# Patient Record
Sex: FEMALE | Race: WHITE | NOT HISPANIC OR LATINO | Employment: FULL TIME | URBAN - METROPOLITAN AREA
[De-identification: names, ages, dates, MRNs, and addresses within clinical notes are randomized per-mention and may not be internally consistent; named-entity substitution may affect disease eponyms.]

---

## 2017-06-29 ENCOUNTER — APPOINTMENT (OUTPATIENT)
Dept: WOMENS IMAGING | Facility: HOSPITAL | Age: 52
End: 2017-06-29

## 2017-06-29 PROCEDURE — 77067 SCR MAMMO BI INCL CAD: CPT | Performed by: RADIOLOGY

## 2017-08-08 ENCOUNTER — OFFICE VISIT (OUTPATIENT)
Dept: CARDIOLOGY | Facility: CLINIC | Age: 52
End: 2017-08-08

## 2017-08-08 VITALS
WEIGHT: 189.5 LBS | DIASTOLIC BLOOD PRESSURE: 94 MMHG | HEART RATE: 58 BPM | HEIGHT: 64 IN | SYSTOLIC BLOOD PRESSURE: 128 MMHG | BODY MASS INDEX: 32.35 KG/M2

## 2017-08-08 DIAGNOSIS — E78.5 HYPERLIPIDEMIA, UNSPECIFIED HYPERLIPIDEMIA TYPE: Primary | ICD-10-CM

## 2017-08-08 PROCEDURE — 93000 ELECTROCARDIOGRAM COMPLETE: CPT | Performed by: NURSE PRACTITIONER

## 2017-08-08 PROCEDURE — 99213 OFFICE O/P EST LOW 20 MIN: CPT | Performed by: NURSE PRACTITIONER

## 2017-08-08 RX ORDER — EVENING PRIMROSE OIL 500 MG
CAPSULE ORAL DAILY
COMMUNITY
End: 2018-04-26

## 2017-08-08 NOTE — PROGRESS NOTES
Date of Office Visit: 2017  Encounter Provider: MINGO Acosta  Place of Service: [facility] CARDIOLOGY  Patient Name: SHANNAN Teague  :1965    Chief Complaint   Patient presents with   • Coronary Artery Disease     yearly follow up   :     HPI: SHANNAN Teague is a 51 y.o. female comes in today for yearly follow up. SHe has been followed for her cardiac risks.     She is a patient of Dr. Fernandez.  I am seeing her for the first time today and have reviewed her records.  In , she had a stress echocardiogram performed for chest pain.  This was negative and her ejection fraction was 55-60%.  All of her valves were normal.      She has a significant family history of coronary disease.  Her mother had a triple bypass at the age of 63.  Her brother had quadruple bypass at 57 and another brother had a myocardial infarction at 57.  Her father had a myocardial infarction at 65.  Her mother also had TIAs and hypertension runs in her family.      In 2014 she had vascular screening that was normal.  She had a calcium score performed in 2014 that gave her a score of 95, and all that calcium really was in the LAD artery.      Today, she comes in for follow-up.  She has been feeling well over the past year.  She did have some problems with gastrointestinal issues but this has now resolved.  She denies any palpitations or tachycardia, shortness of breath, edema, dizziness, chest pain, fatigue, orthopnea, or paroxysmal nocturnal dyspnea.  She denies syncope or presyncope.  She is going to schedule a follow-up with her primary care physician to schedule her lipid panel and other yearly testing.          Past Medical History:   Diagnosis Date   • Acute epigastric pain    • Bloating    • Conjunctivitis    • Diarrhea    • Dysphagia    • H/O colonoscopy 2013   • H/O echocardiogram 2012   • Health care maintenance    • History of EKG 2015   • Hyperlipidemia    •  "Hypothyroidism    • Insomnia    • Nausea & vomiting        Past Surgical History:   Procedure Laterality Date   • BACK SURGERY     • COLONOSCOPY  11/22/2013           Review of Systems   Constitution: Negative for fever and malaise/fatigue.   HENT: Negative for ear pain, hearing loss, nosebleeds and sore throat.    Eyes: Negative for double vision, pain, vision loss in left eye, vision loss in right eye and visual disturbance.   Cardiovascular: Negative for claudication and leg swelling.   Respiratory: Negative for cough, snoring and wheezing.    Endocrine: Negative for cold intolerance, heat intolerance and polyuria.   Skin: Negative for color change, itching and rash.   Musculoskeletal: Negative for joint pain, joint swelling and muscle cramps.   Gastrointestinal: Negative for abdominal pain, diarrhea, melena, nausea and vomiting.   Genitourinary: Negative for bladder incontinence and hematuria.   Neurological: Negative for excessive daytime sleepiness, dizziness, light-headedness, paresthesias and seizures.   Psychiatric/Behavioral: Negative for depression. The patient is not nervous/anxious.    All other systems reviewed and are negative.    All other systems reviewed and are negative    Allergies   Allergen Reactions   • Azithromycin    • Penicillins        All aspects of family and social history reviewed.          Objective:     Vitals:    08/08/17 0948   BP: 128/94   BP Location: Right arm   Pulse: 58   Weight: 189 lb 8 oz (86 kg)   Height: 64\" (162.6 cm)     Body mass index is 32.53 kg/(m^2).    PHYSICAL EXAM:  Physical Exam   Constitutional: She is oriented to person, place, and time. She appears well-developed and well-nourished.   overweight   HENT:   Head: Normocephalic and atraumatic.   Neck: Neck supple. No JVD present.   Cardiovascular: Normal rate, regular rhythm, normal heart sounds and intact distal pulses.    Pulses:       Carotid pulses are 2+ on the right side, and 2+ on the left side.       " Radial pulses are 2+ on the right side, and 2+ on the left side.        Dorsalis pedis pulses are 2+ on the right side, and 2+ on the left side.   Pulmonary/Chest: Effort normal and breath sounds normal. No accessory muscle usage. No respiratory distress. She has no rales.   Abdominal: Soft. Normal appearance and bowel sounds are normal. There is no tenderness.   Musculoskeletal: Normal range of motion. She exhibits no edema.   Neurological: She is alert and oriented to person, place, and time.   Skin: Skin is warm, dry and intact. She is not diaphoretic.   Psychiatric: She has a normal mood and affect. Her speech is normal and behavior is normal. Judgment and thought content normal. Cognition and memory are normal.         ECG 12 Lead  Date/Time: 8/8/2017 10:12 AM  Performed by: TERENCE SIFUENTES  Authorized by: TERENCE SIFUENTES   Comparison: compared with previous ECG from 8/4/2016  Similar to previous ECG  Rhythm: sinus rhythm  Rate: normal  BPM: 58  Conduction: conduction normal  ST Segments: ST segments normal  T Waves: T waves normal  QRS axis: normal  Other: no other findings  Clinical impression: normal ECG  Comments: Indication: family history, elevated calcium score                Assessment:       Diagnosis Plan   1. Hyperlipidemia, unspecified hyperlipidemia type          Orders Placed This Encounter   Procedures   • ECG 12 Lead     This order was created via procedure documentation       Current Outpatient Prescriptions   Medication Sig Dispense Refill   • Evening Primrose Oil 500 MG capsule Take  by mouth Daily.     • levothyroxine (SYNTHROID, LEVOTHROID) 88 MCG tablet Take 1 tablet by mouth daily. 30 tablet 11   • traZODone (DESYREL) 50 MG tablet Take 1 tablet by mouth every night. 90 tablet 3     No current facility-administered medications for this visit.             Plan:       1. Hyperlipidemia.  The last lipid panel was over a year ago.  At last office visit, she was going to try atorvastatin 10  mg twice a week.  This has not been started.  She is going to follow up with Dr. Galdamez to have her lipid panel drawn.      Strong family history of cardiovascular disease.  Calcium score of less than 100.  Continue to monitor.  No symptoms.      Follow up in the office in one year with Dr. Fernandez.          As always, it has been a pleasure to participate in this patient's care.      Sincerely,      MINGO Acosta

## 2017-08-11 DIAGNOSIS — Z00.00 PREVENTATIVE HEALTH CARE: Primary | ICD-10-CM

## 2017-08-11 DIAGNOSIS — Z11.59 NEED FOR HEPATITIS C SCREENING TEST: ICD-10-CM

## 2017-08-16 ENCOUNTER — RESULTS ENCOUNTER (OUTPATIENT)
Dept: SPORTS MEDICINE | Facility: CLINIC | Age: 52
End: 2017-08-16

## 2017-08-16 DIAGNOSIS — Z11.59 NEED FOR HEPATITIS C SCREENING TEST: ICD-10-CM

## 2017-08-16 DIAGNOSIS — Z00.00 PREVENTATIVE HEALTH CARE: ICD-10-CM

## 2017-08-18 DIAGNOSIS — E03.9 ACQUIRED HYPOTHYROIDISM: ICD-10-CM

## 2017-08-18 LAB
ALBUMIN SERPL-MCNC: 4.5 G/DL (ref 3.5–5.2)
ALBUMIN/GLOB SERPL: 1.7 G/DL
ALP SERPL-CCNC: 42 U/L (ref 39–117)
ALT SERPL-CCNC: 23 U/L (ref 1–33)
APPEARANCE UR: CLEAR
AST SERPL-CCNC: 19 U/L (ref 1–32)
BACTERIA #/AREA URNS HPF: ABNORMAL /HPF
BACTERIA UR CULT: ABNORMAL
BASOPHILS # BLD AUTO: 0.02 10*3/MM3 (ref 0–0.2)
BASOPHILS NFR BLD AUTO: 0.4 % (ref 0–1.5)
BILIRUB SERPL-MCNC: 0.9 MG/DL (ref 0.1–1.2)
BILIRUB UR QL STRIP: NEGATIVE
BUN SERPL-MCNC: 29 MG/DL (ref 6–20)
BUN/CREAT SERPL: 27.6 (ref 7–25)
CALCIUM SERPL-MCNC: 10 MG/DL (ref 8.6–10.5)
CHLORIDE SERPL-SCNC: 104 MMOL/L (ref 98–107)
CHOLEST SERPL-MCNC: 232 MG/DL (ref 0–200)
CHOLEST/HDLC SERPL: 4.64 {RATIO}
CO2 SERPL-SCNC: 25.5 MMOL/L (ref 22–29)
COLOR UR: YELLOW
CREAT SERPL-MCNC: 1.05 MG/DL (ref 0.57–1)
EOSINOPHIL # BLD AUTO: 0.1 10*3/MM3 (ref 0–0.7)
EOSINOPHIL NFR BLD AUTO: 2.2 % (ref 0.3–6.2)
EPI CELLS #/AREA URNS HPF: ABNORMAL /HPF
ERYTHROCYTE [DISTWIDTH] IN BLOOD BY AUTOMATED COUNT: 13.4 % (ref 11.7–13)
GLOBULIN SER CALC-MCNC: 2.6 GM/DL
GLUCOSE SERPL-MCNC: 97 MG/DL (ref 65–99)
GLUCOSE UR QL: NEGATIVE
HCT VFR BLD AUTO: 42.4 % (ref 35.6–45.5)
HCV AB S/CO SERPL IA: <0.1 S/CO RATIO (ref 0–0.9)
HDLC SERPL-MCNC: 50 MG/DL (ref 40–60)
HGB BLD-MCNC: 13.1 G/DL (ref 11.9–15.5)
HGB UR QL STRIP: NEGATIVE
IMM GRANULOCYTES # BLD: 0 10*3/MM3 (ref 0–0.03)
IMM GRANULOCYTES NFR BLD: 0 % (ref 0–0.5)
KETONES UR QL STRIP: NEGATIVE
LDLC SERPL CALC-MCNC: 158 MG/DL (ref 0–100)
LEUKOCYTE ESTERASE UR QL STRIP: ABNORMAL
LYMPHOCYTES # BLD AUTO: 2.44 10*3/MM3 (ref 0.9–4.8)
LYMPHOCYTES NFR BLD AUTO: 53.3 % (ref 19.6–45.3)
MCH RBC QN AUTO: 30.2 PG (ref 26.9–32)
MCHC RBC AUTO-ENTMCNC: 30.9 G/DL (ref 32.4–36.3)
MCV RBC AUTO: 97.7 FL (ref 80.5–98.2)
MICRO URNS: ABNORMAL
MONOCYTES # BLD AUTO: 0.38 10*3/MM3 (ref 0.2–1.2)
MONOCYTES NFR BLD AUTO: 8.3 % (ref 5–12)
NEUTROPHILS # BLD AUTO: 1.64 10*3/MM3 (ref 1.9–8.1)
NEUTROPHILS NFR BLD AUTO: 35.8 % (ref 42.7–76)
NITRITE UR QL STRIP: NEGATIVE
OTHER ANTIBIOTIC SUSC ISLT: ABNORMAL
PH UR STRIP: 5.5 [PH] (ref 5–7.5)
PLATELET # BLD AUTO: 216 10*3/MM3 (ref 140–500)
POTASSIUM SERPL-SCNC: 5 MMOL/L (ref 3.5–5.2)
PROT SERPL-MCNC: 7.1 G/DL (ref 6–8.5)
PROT UR QL STRIP: NEGATIVE
RBC # BLD AUTO: 4.34 10*6/MM3 (ref 3.9–5.2)
RBC #/AREA URNS HPF: ABNORMAL /HPF
SODIUM SERPL-SCNC: 141 MMOL/L (ref 136–145)
SP GR UR: 1.02 (ref 1–1.03)
T4 FREE SERPL-MCNC: 1.46 NG/DL (ref 0.93–1.7)
TRIGL SERPL-MCNC: 120 MG/DL (ref 0–150)
TSH SERPL DL<=0.005 MIU/L-ACNC: 5.39 MIU/ML (ref 0.27–4.2)
URINALYSIS REFLEX: ABNORMAL
UROBILINOGEN UR STRIP-MCNC: 0.2 MG/DL (ref 0.2–1)
VLDLC SERPL CALC-MCNC: 24 MG/DL (ref 5–40)
WBC # BLD AUTO: 4.58 10*3/MM3 (ref 4.5–10.7)
WBC #/AREA URNS HPF: ABNORMAL /HPF

## 2017-08-18 RX ORDER — LEVOTHYROXINE SODIUM 88 UG/1
TABLET ORAL
Qty: 30 TABLET | Refills: 0 | OUTPATIENT
Start: 2017-08-18

## 2017-08-18 RX ORDER — LEVOTHYROXINE SODIUM 0.1 MG/1
100 TABLET ORAL DAILY
Qty: 30 TABLET | Refills: 11 | Status: SHIPPED | OUTPATIENT
Start: 2017-08-18 | End: 2017-08-18 | Stop reason: SDUPTHER

## 2017-08-21 RX ORDER — LEVOTHYROXINE SODIUM 0.1 MG/1
TABLET ORAL
Qty: 90 TABLET | Refills: 11 | Status: SHIPPED | OUTPATIENT
Start: 2017-08-21 | End: 2018-04-26 | Stop reason: SDUPTHER

## 2017-08-21 RX ORDER — LEVOTHYROXINE SODIUM 88 UG/1
TABLET ORAL
Qty: 30 TABLET | Refills: 0 | OUTPATIENT
Start: 2017-08-21

## 2017-09-11 ENCOUNTER — TELEPHONE (OUTPATIENT)
Dept: SPORTS MEDICINE | Facility: CLINIC | Age: 52
End: 2017-09-11

## 2017-09-11 DIAGNOSIS — E03.9 ACQUIRED HYPOTHYROIDISM: Primary | ICD-10-CM

## 2017-09-11 NOTE — TELEPHONE ENCOUNTER
Patient thinks that new dose of synthroid is making her real jittery, she would like to know what to do

## 2017-09-12 ENCOUNTER — LAB (OUTPATIENT)
Dept: SPORTS MEDICINE | Facility: CLINIC | Age: 52
End: 2017-09-12

## 2017-09-12 DIAGNOSIS — E03.9 ACQUIRED HYPOTHYROIDISM: ICD-10-CM

## 2017-09-12 LAB
T4 FREE SERPL-MCNC: 1.59 NG/DL (ref 0.93–1.7)
TSH SERPL DL<=0.005 MIU/L-ACNC: 0.95 MIU/ML (ref 0.27–4.2)

## 2017-09-13 ENCOUNTER — TELEPHONE (OUTPATIENT)
Dept: SPORTS MEDICINE | Facility: CLINIC | Age: 52
End: 2017-09-13

## 2017-09-13 RX ORDER — DEXTROMETHORPHAN HYDROBROMIDE AND PROMETHAZINE HYDROCHLORIDE 15; 6.25 MG/5ML; MG/5ML
1.25 SYRUP ORAL 4 TIMES DAILY PRN
Qty: 180 ML | Refills: 0 | Status: SHIPPED | OUTPATIENT
Start: 2017-09-13 | End: 2018-02-13

## 2017-09-13 RX ORDER — DOXYCYCLINE 100 MG/1
100 TABLET ORAL 2 TIMES DAILY
Qty: 20 TABLET | Refills: 0 | Status: SHIPPED | OUTPATIENT
Start: 2017-09-13 | End: 2017-09-23

## 2017-09-13 NOTE — TELEPHONE ENCOUNTER
The thyroid level looks better but it could eventually be too high of a dose.  They do not make a dose in between her old and new dose.  We can go back to the old dose and recheck levels in 6 weeks

## 2017-09-18 DIAGNOSIS — F51.01 PRIMARY INSOMNIA: ICD-10-CM

## 2017-09-19 DIAGNOSIS — F51.01 PRIMARY INSOMNIA: ICD-10-CM

## 2017-09-20 RX ORDER — TRAZODONE HYDROCHLORIDE 50 MG/1
TABLET ORAL
Qty: 90 TABLET | Refills: 0 | OUTPATIENT
Start: 2017-09-20

## 2018-02-13 ENCOUNTER — OFFICE VISIT (OUTPATIENT)
Dept: SPORTS MEDICINE | Facility: CLINIC | Age: 53
End: 2018-02-13

## 2018-02-13 VITALS
BODY MASS INDEX: 33.97 KG/M2 | TEMPERATURE: 97.8 F | OXYGEN SATURATION: 98 % | WEIGHT: 199 LBS | SYSTOLIC BLOOD PRESSURE: 112 MMHG | HEIGHT: 64 IN | HEART RATE: 72 BPM | DIASTOLIC BLOOD PRESSURE: 84 MMHG

## 2018-02-13 DIAGNOSIS — H69.81 ETD (EUSTACHIAN TUBE DYSFUNCTION), RIGHT: Primary | ICD-10-CM

## 2018-02-13 DIAGNOSIS — E03.9 ACQUIRED HYPOTHYROIDISM: ICD-10-CM

## 2018-02-13 PROCEDURE — 99213 OFFICE O/P EST LOW 20 MIN: CPT | Performed by: FAMILY MEDICINE

## 2018-02-13 RX ORDER — METHYLPREDNISOLONE 4 MG/1
TABLET ORAL
Qty: 21 TABLET | Refills: 0 | Status: SHIPPED | OUTPATIENT
Start: 2018-02-13 | End: 2018-04-26

## 2018-02-13 NOTE — PROGRESS NOTES
"Sierra is a 52 y.o. year old female    Chief Complaint   Patient presents with   • Hearing Problem       History of Present Illness   HPI   For the past 7-8 days patient has noted decreased hearing right ear.  She has not had any sinus complaints.  She went to immediate care center was given Flonase and antihistamine for possible eustachian tube dysfunction.  This medication has not been very helpful.  She does note there is occasional clearing along with some clicking and popping when she tries to open her eustachian tube using diver's  maneuver.  No fever or chills.  I have reviewed the patient's medical, family, and social history in detail and updated the computerized patient record.    Review of Systems   Constitutional: Negative.    HENT: Positive for hearing loss.         O/w -   Eyes: Negative.    Respiratory: Negative.    Cardiovascular: Negative.    Gastrointestinal: Negative.    Endocrine: Negative.    Genitourinary: Negative.    Musculoskeletal: Negative.    Neurological: Negative.        /84  Pulse 72  Temp 97.8 °F (36.6 °C)  Ht 162.6 cm (64\")  Wt 90.3 kg (199 lb)  SpO2 98%  BMI 34.16 kg/m2     Physical Exam   Constitutional: She is oriented to person, place, and time. She appears well-developed and well-nourished.   HENT:   Head: Normocephalic and atraumatic.   Right Ear: External ear normal.   Left Ear: External ear normal.   Nose: Nose normal.   Mouth/Throat: Oropharynx is clear and moist.   Eyes: Conjunctivae and EOM are normal. Pupils are equal, round, and reactive to light.   Neck: Normal range of motion. Neck supple. No thyromegaly present.   Cardiovascular: Normal rate, regular rhythm and normal heart sounds.    No peripheral edema   Pulmonary/Chest: Effort normal and breath sounds normal.   Neurological: She is alert and oriented to person, place, and time.   Skin: Skin is warm, dry and intact.   Psychiatric: She has a normal mood and affect. Her behavior is normal. Thought content " normal.   Vitals reviewed.       Diagnoses and all orders for this visit:    ETD (Eustachian tube dysfunction), right  -     MethylPREDNISolone (MEDROL, JONES,) 4 MG tablet; Take as directed on package instructions.    Acquired hypothyroidism  -     TSH  -     T4, Free       Patient's exam is completely normal.  Certainly sounds like she has a right eustachian tube dysfunction.  If she does not see improvement after the Medrol Dosepak and refer to ENT.    Patient is due for recheck TSH and free T4.      EMR Dragon/Transcription disclaimer:    Much of this encounter note is an electronic transcription/translation of spoken language to printed text.  The electronic translation of spoken language may permit erroneous, or at times, nonsensical words or phrases to be inadvertently transcribed.  Although I have reviewed the note for such errors some may still exist.

## 2018-02-14 LAB
T4 FREE SERPL-MCNC: 1.51 NG/DL (ref 0.93–1.7)
TSH SERPL DL<=0.005 MIU/L-ACNC: 2.28 MIU/ML (ref 0.27–4.2)

## 2018-04-26 ENCOUNTER — OFFICE VISIT (OUTPATIENT)
Dept: FAMILY MEDICINE CLINIC | Facility: CLINIC | Age: 53
End: 2018-04-26

## 2018-04-26 VITALS
DIASTOLIC BLOOD PRESSURE: 86 MMHG | HEART RATE: 66 BPM | HEIGHT: 64 IN | RESPIRATION RATE: 13 BRPM | BODY MASS INDEX: 33.8 KG/M2 | SYSTOLIC BLOOD PRESSURE: 120 MMHG | OXYGEN SATURATION: 98 % | WEIGHT: 198 LBS

## 2018-04-26 DIAGNOSIS — E03.9 HYPOTHYROIDISM, UNSPECIFIED TYPE: Primary | ICD-10-CM

## 2018-04-26 DIAGNOSIS — E78.00 PURE HYPERCHOLESTEROLEMIA: ICD-10-CM

## 2018-04-26 DIAGNOSIS — Z13.1 SCREENING FOR DIABETES MELLITUS: ICD-10-CM

## 2018-04-26 DIAGNOSIS — Z23 NEED FOR VACCINATION: ICD-10-CM

## 2018-04-26 DIAGNOSIS — F51.01 PRIMARY INSOMNIA: ICD-10-CM

## 2018-04-26 PROCEDURE — 99213 OFFICE O/P EST LOW 20 MIN: CPT | Performed by: FAMILY MEDICINE

## 2018-04-26 RX ORDER — LEVOTHYROXINE SODIUM 0.1 MG/1
100 TABLET ORAL DAILY
Qty: 30 TABLET | Refills: 1 | Status: SHIPPED | OUTPATIENT
Start: 2018-04-26 | End: 2018-06-11 | Stop reason: SDUPTHER

## 2018-04-26 RX ORDER — TRAZODONE HYDROCHLORIDE 50 MG/1
50 TABLET ORAL NIGHTLY
Qty: 90 TABLET | Refills: 3 | Status: SHIPPED | OUTPATIENT
Start: 2018-04-26 | End: 2019-04-05 | Stop reason: SDUPTHER

## 2018-05-25 ENCOUNTER — HOSPITAL ENCOUNTER (EMERGENCY)
Facility: HOSPITAL | Age: 53
Discharge: HOME | End: 2018-05-25
Attending: EMERGENCY MEDICINE | Admitting: EMERGENCY MEDICINE

## 2018-05-25 ENCOUNTER — APPOINTMENT (OUTPATIENT)
Dept: CT IMAGING | Facility: HOSPITAL | Age: 53
End: 2018-05-25

## 2018-05-25 VITALS
HEIGHT: 64 IN | SYSTOLIC BLOOD PRESSURE: 148 MMHG | TEMPERATURE: 98.8 F | OXYGEN SATURATION: 100 % | HEART RATE: 85 BPM | RESPIRATION RATE: 18 BRPM | DIASTOLIC BLOOD PRESSURE: 101 MMHG | WEIGHT: 190 LBS | BODY MASS INDEX: 32.44 KG/M2

## 2018-05-25 DIAGNOSIS — K52.9 COLITIS PRESUMED INFECTIOUS: Primary | ICD-10-CM

## 2018-05-25 LAB
ALBUMIN SERPL-MCNC: 4.5 G/DL (ref 3.5–5.2)
ALBUMIN/GLOB SERPL: 1.4 G/DL
ALP SERPL-CCNC: 50 U/L (ref 39–117)
ALT SERPL W P-5'-P-CCNC: 20 U/L (ref 1–33)
ANION GAP SERPL CALCULATED.3IONS-SCNC: 12 MMOL/L
AST SERPL-CCNC: 20 U/L (ref 1–32)
BASOPHILS # BLD AUTO: 0.01 10*3/MM3 (ref 0–0.2)
BASOPHILS NFR BLD AUTO: 0.1 % (ref 0–1.5)
BILIRUB SERPL-MCNC: 0.8 MG/DL (ref 0.1–1.2)
BUN BLD-MCNC: 19 MG/DL (ref 6–20)
BUN/CREAT SERPL: 18.4 (ref 7–25)
CALCIUM SPEC-SCNC: 9.9 MG/DL (ref 8.6–10.5)
CHLORIDE SERPL-SCNC: 102 MMOL/L (ref 98–107)
CO2 SERPL-SCNC: 26 MMOL/L (ref 22–29)
CREAT BLD-MCNC: 1.03 MG/DL (ref 0.57–1)
DEPRECATED RDW RBC AUTO: 42.5 FL (ref 37–54)
EOSINOPHIL # BLD AUTO: 0.04 10*3/MM3 (ref 0–0.7)
EOSINOPHIL NFR BLD AUTO: 0.5 % (ref 0.3–6.2)
ERYTHROCYTE [DISTWIDTH] IN BLOOD BY AUTOMATED COUNT: 12.6 % (ref 11.7–13)
GFR SERPL CREATININE-BSD FRML MDRD: 56 ML/MIN/1.73
GLOBULIN UR ELPH-MCNC: 3.2 GM/DL
GLUCOSE BLD-MCNC: 102 MG/DL (ref 65–99)
HCG SERPL QL: NEGATIVE
HCT VFR BLD AUTO: 41.7 % (ref 35.6–45.5)
HGB BLD-MCNC: 14.2 G/DL (ref 11.9–15.5)
HOLD SPECIMEN: NORMAL
IMM GRANULOCYTES # BLD: 0.02 10*3/MM3 (ref 0–0.03)
IMM GRANULOCYTES NFR BLD: 0.2 % (ref 0–0.5)
LIPASE SERPL-CCNC: 26 U/L (ref 13–60)
LYMPHOCYTES # BLD AUTO: 2.41 10*3/MM3 (ref 0.9–4.8)
LYMPHOCYTES NFR BLD AUTO: 29 % (ref 19.6–45.3)
MCH RBC QN AUTO: 31.4 PG (ref 26.9–32)
MCHC RBC AUTO-ENTMCNC: 34.1 G/DL (ref 32.4–36.3)
MCV RBC AUTO: 92.3 FL (ref 80.5–98.2)
MONOCYTES # BLD AUTO: 0.44 10*3/MM3 (ref 0.2–1.2)
MONOCYTES NFR BLD AUTO: 5.3 % (ref 5–12)
NEUTROPHILS # BLD AUTO: 5.42 10*3/MM3 (ref 1.9–8.1)
NEUTROPHILS NFR BLD AUTO: 65.1 % (ref 42.7–76)
PLATELET # BLD AUTO: 268 10*3/MM3 (ref 140–500)
PMV BLD AUTO: 9.6 FL (ref 6–12)
POTASSIUM BLD-SCNC: 4.6 MMOL/L (ref 3.5–5.2)
PROT SERPL-MCNC: 7.7 G/DL (ref 6–8.5)
RBC # BLD AUTO: 4.52 10*6/MM3 (ref 3.9–5.2)
SODIUM BLD-SCNC: 140 MMOL/L (ref 136–145)
WBC NRBC COR # BLD: 8.32 10*3/MM3 (ref 4.5–10.7)
WHOLE BLOOD HOLD SPECIMEN: NORMAL
WHOLE BLOOD HOLD SPECIMEN: NORMAL

## 2018-05-25 PROCEDURE — 96375 TX/PRO/DX INJ NEW DRUG ADDON: CPT

## 2018-05-25 PROCEDURE — 85025 COMPLETE CBC W/AUTO DIFF WBC: CPT

## 2018-05-25 PROCEDURE — 25010000002 IOPAMIDOL 61 % SOLUTION: Performed by: EMERGENCY MEDICINE

## 2018-05-25 PROCEDURE — 96376 TX/PRO/DX INJ SAME DRUG ADON: CPT

## 2018-05-25 PROCEDURE — 99283 EMERGENCY DEPT VISIT LOW MDM: CPT

## 2018-05-25 PROCEDURE — 25010000002 ONDANSETRON PER 1 MG: Performed by: EMERGENCY MEDICINE

## 2018-05-25 PROCEDURE — 83690 ASSAY OF LIPASE: CPT | Performed by: EMERGENCY MEDICINE

## 2018-05-25 PROCEDURE — 80053 COMPREHEN METABOLIC PANEL: CPT | Performed by: EMERGENCY MEDICINE

## 2018-05-25 PROCEDURE — 84703 CHORIONIC GONADOTROPIN ASSAY: CPT | Performed by: EMERGENCY MEDICINE

## 2018-05-25 PROCEDURE — 74177 CT ABD & PELVIS W/CONTRAST: CPT

## 2018-05-25 PROCEDURE — 96374 THER/PROPH/DIAG INJ IV PUSH: CPT

## 2018-05-25 PROCEDURE — 36415 COLL VENOUS BLD VENIPUNCTURE: CPT

## 2018-05-25 RX ORDER — SODIUM CHLORIDE 0.9 % (FLUSH) 0.9 %
10 SYRINGE (ML) INJECTION AS NEEDED
Status: DISCONTINUED | OUTPATIENT
Start: 2018-05-25 | End: 2018-05-25 | Stop reason: HOSPADM

## 2018-05-25 RX ORDER — HYDROMORPHONE HYDROCHLORIDE 1 MG/ML
0.5 INJECTION, SOLUTION INTRAMUSCULAR; INTRAVENOUS; SUBCUTANEOUS ONCE
Status: COMPLETED | OUTPATIENT
Start: 2018-05-25 | End: 2018-05-25

## 2018-05-25 RX ORDER — OXYCODONE HYDROCHLORIDE AND ACETAMINOPHEN 5; 325 MG/1; MG/1
1-2 TABLET ORAL EVERY 6 HOURS PRN
Qty: 15 TABLET | Refills: 0 | Status: SHIPPED | OUTPATIENT
Start: 2018-05-25 | End: 2018-06-11

## 2018-05-25 RX ORDER — ONDANSETRON 2 MG/ML
4 INJECTION INTRAMUSCULAR; INTRAVENOUS ONCE
Status: COMPLETED | OUTPATIENT
Start: 2018-05-25 | End: 2018-05-25

## 2018-05-25 RX ORDER — ASPIRIN 81 MG/1
81 TABLET ORAL DAILY
COMMUNITY
End: 2021-02-23

## 2018-05-25 RX ORDER — METRONIDAZOLE 500 MG/1
500 TABLET ORAL 3 TIMES DAILY
Qty: 21 TABLET | Refills: 0 | Status: SHIPPED | OUTPATIENT
Start: 2018-05-25 | End: 2018-06-11

## 2018-05-25 RX ADMIN — HYDROMORPHONE HYDROCHLORIDE 0.5 MG: 10 INJECTION INTRAMUSCULAR; INTRAVENOUS; SUBCUTANEOUS at 17:46

## 2018-05-25 RX ADMIN — IOPAMIDOL 85 ML: 612 INJECTION, SOLUTION INTRAVENOUS at 17:05

## 2018-05-25 RX ADMIN — HYDROMORPHONE HYDROCHLORIDE 0.5 MG: 10 INJECTION INTRAMUSCULAR; INTRAVENOUS; SUBCUTANEOUS at 16:40

## 2018-05-25 RX ADMIN — ONDANSETRON 4 MG: 2 INJECTION INTRAMUSCULAR; INTRAVENOUS at 16:40

## 2018-05-25 NOTE — ED NOTES
"Pt states abdominal pain that started yesterday. Has had N/V/D with pain. Pain feels \"full, radiates to back.\" Pt states no surgeries. Pt points to mid, upper abdomen as pain source.      Yuli Kennedy RN  05/25/18 2075    "

## 2018-05-25 NOTE — ED PROVIDER NOTES
EMERGENCY DEPARTMENT ENCOUNTER    CHIEF COMPLAINT  Chief Complaint: abd pain  History given by: pt  History limited by: nothing  Room Number:   PMD: Becky Hanson MD      HPI:  Pt is a 52 y.o. female who presents complaining of abd pain. The pain began at 1130 last night and waxes and wanes. The pain is sharp and is generalized. . Laying flat makes the pain worse, but crouching makes it better. She had a similar episode 1 year ago. She never really found the cause of it. She is having vomiting and diarrhea as well. She does not smoke and drinks occasionally.    Duration/Onset/Timin day/sudden/waxing and waning  Location: abd  Radiation: head  Quality: sharp  Intensity/Severity: severe at worst  Associated Symptoms: mild HA  Aggravating or Alleviating Factors: laying flat worsens, crouching help pain  Previous Episodes: Pt had a previous episodes 1 year ago.      PAST MEDICAL HISTORY  Active Ambulatory Problems     Diagnosis Date Noted   • Hypothyroidism 2016   • Hyperlipidemia 2016   • Insomnia 2016   • Family history of heart disease    • Degenerative joint disease (DJD) of lumbar spine      Resolved Ambulatory Problems     Diagnosis Date Noted   • Conjunctivitis 2016   • Health care maintenance 2016   • Diarrhea 2016     Past Medical History:   Diagnosis Date   • Conjunctivitis    • Degenerative joint disease (DJD) of lumbar spine    • Dysphagia    • Family history of heart disease    • H/O echocardiogram 2012   • Hyperlipidemia    • Hypothyroidism    • Insomnia        PAST SURGICAL HISTORY  Past Surgical History:   Procedure Laterality Date   • BACK SURGERY      discectomy, lumbar   • BREAST BIOPSY     • COLONOSCOPY  2013       FAMILY HISTORY  Family History   Problem Relation Age of Onset   • Arthritis Mother         [id number]   • Diabetes Mother    • Heart disease Mother         quad bypass at age 62   • Hyperlipidemia Mother    •  Hypertension Mother    • Thyroid disease Mother    • Stroke Mother 70   • Heart disease Father         CAD   • Hypertension Father    • Thyroid disease Father    • Hyperlipidemia Father    • Cancer Brother 50        throat   • Thyroid disease Brother    • Colon cancer Maternal Grandmother 62   • Heart attack Other    • Thyroid disease Sister    • Diabetes Sister    • Cancer Sister 58        uterine       SOCIAL HISTORY  Social History     Social History   • Marital status: Single     Spouse name: N/A   • Number of children: N/A   • Years of education: N/A     Occupational History   • Not on file.     Social History Main Topics   • Smoking status: Never Smoker   • Smokeless tobacco: Not on file   • Alcohol use Yes      Comment: Ocassionally    • Drug use: No   • Sexual activity: Yes     Partners: Female     Other Topics Concern   • Not on file     Social History Narrative     at [employer].  Lives at home with her wife and their adopted child       ALLERGIES  Azithromycin and Penicillins    REVIEW OF SYSTEMS  Review of Systems   Constitutional: Negative.  Negative for chills and fever.   HENT: Negative for sore throat.    Eyes: Negative.    Respiratory: Negative.  Negative for cough.    Cardiovascular: Negative.  Negative for chest pain.   Gastrointestinal: Positive for abdominal pain (generalized), diarrhea (watery, several times), nausea and vomiting (x1).   Genitourinary: Negative.  Negative for dysuria.   Musculoskeletal: Negative.  Negative for back pain.   Skin: Negative.  Negative for rash.   Neurological: Positive for headaches (mild).   All other systems reviewed and are negative.      PHYSICAL EXAM  ED Triage Vitals   Temp Heart Rate Resp BP SpO2   05/25/18 1500 05/25/18 1500 05/25/18 1500 05/25/18 1516 05/25/18 1500   98.8 °F (37.1 °C) 85 18 (!) 148/101 97 %      Temp src Heart Rate Source Patient Position BP Location FiO2 (%)   05/25/18 1500 05/25/18 1500 -- -- --   Tympanic Monitor           Physical Exam   Constitutional: No distress.   HENT:   Head: Normocephalic and atraumatic.   Mouth/Throat: Oropharynx is clear and moist.   Eyes:   Unremarkable   Cardiovascular: Normal rate and regular rhythm.    Pulmonary/Chest: Breath sounds normal. No respiratory distress.   Abdominal: There is tenderness (general).   Musculoskeletal: She exhibits no edema or tenderness.   Neurological: She is alert.   Skin: No rash noted.   Nursing note and vitals reviewed.      LAB RESULTS  Lab Results (last 24 hours)     Procedure Component Value Units Date/Time    CBC & Differential [[id number]] Collected:  05/25/18 1543    Specimen:  Blood Updated:  05/25/18 1559    Narrative:       The following orders were created for panel order CBC & Differential.  Procedure                               Abnormality         Status                     ---------                               -----------         ------                     CBC Auto Differential[[id number]]        Normal              Final result                 Please view results for these tests on the individual orders.    Comprehensive Metabolic Panel [[id number]]  (Abnormal) Collected:  05/25/18 1543    Specimen:  Blood Updated:  05/25/18 1616     Glucose 102 (H) mg/dL      BUN 19 mg/dL      Creatinine 1.03 (H) mg/dL      Sodium 140 mmol/L      Potassium 4.6 mmol/L      Chloride 102 mmol/L      CO2 26.0 mmol/L      Calcium 9.9 mg/dL      Total Protein 7.7 g/dL      Albumin 4.50 g/dL      ALT (SGPT) 20 U/L      AST (SGOT) 20 U/L      Alkaline Phosphatase 50 U/L      Total Bilirubin 0.8 mg/dL      eGFR Non African Amer 56 (L) mL/min/1.73      Globulin 3.2 gm/dL      A/G Ratio 1.4 g/dL      BUN/Creatinine Ratio 18.4     Anion Gap 12.0 mmol/L     Lipase [[id number]]  (Normal) Collected:  05/25/18 1543    Specimen:  Blood Updated:  05/25/18 1616     Lipase 26 U/L     hCG, Serum, Qualitative [[id number]]  (Normal) Collected:  05/25/18 1543    Specimen:  Blood Updated:   05/25/18 1620     HCG Qualitative Negative    CBC Auto Differential [[id number]]  (Normal) Collected:  05/25/18 1543    Specimen:  Blood Updated:  05/25/18 1559     WBC 8.32 10*3/mm3      RBC 4.52 10*6/mm3      Hemoglobin 14.2 g/dL      Hematocrit 41.7 %      MCV 92.3 fL      MCH 31.4 pg      MCHC 34.1 g/dL      RDW 12.6 %      RDW-SD 42.5 fl      MPV 9.6 fL      Platelets 268 10*3/mm3      Neutrophil % 65.1 %      Lymphocyte % 29.0 %      Monocyte % 5.3 %      Eosinophil % 0.5 %      Basophil % 0.1 %      Immature Grans % 0.2 %      Neutrophils, Absolute 5.42 10*3/mm3      Lymphocytes, Absolute 2.41 10*3/mm3      Monocytes, Absolute 0.44 10*3/mm3      Eosinophils, Absolute 0.04 10*3/mm3      Basophils, Absolute 0.01 10*3/mm3      Immature Grans, Absolute 0.02 10*3/mm3           I ordered the above labs and reviewed the results    RADIOLOGY  CT Abdomen Pelvis With Contrast   Final Result   Possible mild diffuse colitis. Otherwise unremarkable CT   scan of the abdomen and pelvis.       This report was finalized on 5/25/2018 5:51 PM by Dr. Joselito Garcia M.D.               I ordered the above noted radiological studies. Interpreted by radiologist. Discussed with radiologist (Dr. Garcia). Reviewed by me in PACS.       PROCEDURES  Procedures      PROGRESS AND CONSULTS     1620- Initial pt check. I endorsed the plan to provide pot with nausea and pain medication. We are still waiting for pt labs to return. We will also plan for CT abd. I will f/u with results.    1758- Pt recheck. Pt pain is more under control. I advised pt that her CT scan along with her sx suggest colitis. I will provide pt with pain medication and Flagyl to treat her pain and her likely bacterial colitis. I encouraged her to eat a bland diet and consume excessive fluids in the meantime. Pt understands and agrees with the plan, all questions answered.    MEDICAL DECISION MAKING  Results were reviewed/discussed with the patient and they were also made  aware of online access. Pt also made aware that some labs, such as cultures, will not be resulted during ER visit and follow up with PMD is necessary.     MDM  Number of Diagnoses or Management Options     Amount and/or Complexity of Data Reviewed  Clinical lab tests: reviewed and ordered (Lipase- 26, hCG- negative)  Tests in the radiology section of CPT®: reviewed and ordered (CT abd- possible mild diffuse colitis)  Decide to obtain previous medical records or to obtain history from someone other than the patient: yes  Review and summarize past medical records: yes (Pt seen in ER for acute gastritis on 10/19/16)           DIAGNOSIS  Final diagnoses:   Colitis presumed infectious       DISPOSITION  DISCHARGE    Patient discharged in stable condition.    Reviewed implications of results, diagnosis, meds, responsibility to follow up, warning signs and symptoms of possible worsening, potential complications and reasons to return to ER, including severe worsening of pain or fever.    Patient/Family voiced understanding of above instructions.    Discussed plan for discharge, as there is no emergent indication for admission. Patient referred to primary care provider for BP management due to today's BP. Pt/family is agreeable and understands need for follow up and repeat testing.  Pt is aware that discharge does not mean that nothing is wrong but it indicates no emergency is present that requires admission and they must continue care with follow-up as given below or physician of their choice.     FOLLOW-UP  Becky Hanson MD  [address] [facility] [address] [phone]    In 3 days  If Not Better         Medication List      New Prescriptions    metroNIDAZOLE 500 MG tablet  Commonly known as:  FLAGYL  Take 1 tablet by mouth 3 (Three) Times a Day.     oxyCODONE-acetaminophen 5-325 MG per tablet  Commonly known as:  ROXICET  Take 1-2 tablets by mouth Every 6 (Six) Hours As Needed for Moderate Pain    .              Latest Documented Vital Signs:  As of 6:03 PM  BP- (!) 148/101 HR- 85 Temp- 98.8 °F (37.1 °C) (Tympanic) O2 sat- 100%    --  Documentation assistance provided by ho Mehta for Dr. Le.  Information recorded by the scribe was done at my direction and has been verified and validated by me.     Finesse Mehta  05/25/18 1803       Joselito Le MD  05/25/18 180

## 2018-05-29 ENCOUNTER — TELEPHONE (OUTPATIENT)
Dept: SOCIAL WORK | Facility: HOSPITAL | Age: 53
End: 2018-05-29

## 2018-05-29 NOTE — TELEPHONE ENCOUNTER
ER F/U phone call:   Pt states that she is feeling a little better. She is to see her PCP on 6-11-18. No other questions or concerns voiced at this time. Thania Longo RN

## 2018-06-05 ENCOUNTER — LAB (OUTPATIENT)
Dept: FAMILY MEDICINE CLINIC | Facility: CLINIC | Age: 53
End: 2018-06-05

## 2018-06-05 DIAGNOSIS — E78.00 PURE HYPERCHOLESTEROLEMIA: ICD-10-CM

## 2018-06-05 DIAGNOSIS — E03.9 HYPOTHYROIDISM, UNSPECIFIED TYPE: ICD-10-CM

## 2018-06-05 DIAGNOSIS — F51.01 PRIMARY INSOMNIA: ICD-10-CM

## 2018-06-05 DIAGNOSIS — Z13.1 SCREENING FOR DIABETES MELLITUS: ICD-10-CM

## 2018-06-05 DIAGNOSIS — Z23 NEED FOR VACCINATION: ICD-10-CM

## 2018-06-05 LAB
ALBUMIN SERPL-MCNC: 4.1 G/DL (ref 3.5–5.2)
ALBUMIN/GLOB SERPL: 1.8 G/DL
ALP SERPL-CCNC: 43 U/L (ref 39–117)
ALT SERPL-CCNC: 33 U/L (ref 1–33)
AST SERPL-CCNC: 18 U/L (ref 1–32)
BASOPHILS # BLD AUTO: 0.01 10*3/MM3 (ref 0–0.2)
BASOPHILS NFR BLD AUTO: 0.2 % (ref 0–1.5)
BILIRUB SERPL-MCNC: 0.5 MG/DL (ref 0.1–1.2)
BUN SERPL-MCNC: 30 MG/DL (ref 6–20)
BUN/CREAT SERPL: 27.3 (ref 7–25)
CALCIUM SERPL-MCNC: 9.3 MG/DL (ref 8.6–10.5)
CHLORIDE SERPL-SCNC: 105 MMOL/L (ref 98–107)
CHOLEST SERPL-MCNC: 183 MG/DL (ref 0–200)
CO2 SERPL-SCNC: 23.3 MMOL/L (ref 22–29)
CREAT SERPL-MCNC: 1.1 MG/DL (ref 0.57–1)
EOSINOPHIL # BLD AUTO: 0.12 10*3/MM3 (ref 0–0.7)
EOSINOPHIL NFR BLD AUTO: 2.6 % (ref 0.3–6.2)
ERYTHROCYTE [DISTWIDTH] IN BLOOD BY AUTOMATED COUNT: 13.3 % (ref 11.7–13)
GFR SERPLBLD CREATININE-BSD FMLA CKD-EPI: 52 ML/MIN/1.73
GFR SERPLBLD CREATININE-BSD FMLA CKD-EPI: 63 ML/MIN/1.73
GLOBULIN SER CALC-MCNC: 2.3 GM/DL
GLUCOSE SERPL-MCNC: 91 MG/DL (ref 65–99)
HBA1C MFR BLD: 5.56 % (ref 4.8–5.6)
HCT VFR BLD AUTO: 41.9 % (ref 35.6–45.5)
HDLC SERPL-MCNC: 41 MG/DL (ref 40–60)
HGB BLD-MCNC: 13.1 G/DL (ref 11.9–15.5)
IMM GRANULOCYTES # BLD: 0 10*3/MM3 (ref 0–0.03)
IMM GRANULOCYTES NFR BLD: 0 % (ref 0–0.5)
LDLC SERPL CALC-MCNC: 118 MG/DL (ref 0–100)
LDLC/HDLC SERPL: 2.88 {RATIO}
LYMPHOCYTES # BLD AUTO: 2.49 10*3/MM3 (ref 0.9–4.8)
LYMPHOCYTES NFR BLD AUTO: 53.8 % (ref 19.6–45.3)
MCH RBC QN AUTO: 30.7 PG (ref 26.9–32)
MCHC RBC AUTO-ENTMCNC: 31.3 G/DL (ref 32.4–36.3)
MCV RBC AUTO: 98.1 FL (ref 80.5–98.2)
MONOCYTES # BLD AUTO: 0.29 10*3/MM3 (ref 0.2–1.2)
MONOCYTES NFR BLD AUTO: 6.3 % (ref 5–12)
NEUTROPHILS # BLD AUTO: 1.72 10*3/MM3 (ref 1.9–8.1)
NEUTROPHILS NFR BLD AUTO: 37.1 % (ref 42.7–76)
PLATELET # BLD AUTO: 252 10*3/MM3 (ref 140–500)
POTASSIUM SERPL-SCNC: 4.4 MMOL/L (ref 3.5–5.2)
PROT SERPL-MCNC: 6.4 G/DL (ref 6–8.5)
RBC # BLD AUTO: 4.27 10*6/MM3 (ref 3.9–5.2)
SODIUM SERPL-SCNC: 142 MMOL/L (ref 136–145)
TRIGL SERPL-MCNC: 119 MG/DL (ref 0–150)
TSH SERPL DL<=0.005 MIU/L-ACNC: 0.91 MIU/ML (ref 0.27–4.2)
VLDLC SERPL CALC-MCNC: 23.8 MG/DL (ref 5–40)
WBC # BLD AUTO: 4.63 10*3/MM3 (ref 4.5–10.7)

## 2018-06-11 ENCOUNTER — OFFICE VISIT (OUTPATIENT)
Dept: FAMILY MEDICINE CLINIC | Facility: CLINIC | Age: 53
End: 2018-06-11

## 2018-06-11 VITALS
RESPIRATION RATE: 13 BRPM | DIASTOLIC BLOOD PRESSURE: 92 MMHG | WEIGHT: 194 LBS | HEART RATE: 78 BPM | SYSTOLIC BLOOD PRESSURE: 122 MMHG | OXYGEN SATURATION: 99 % | BODY MASS INDEX: 33.12 KG/M2 | HEIGHT: 64 IN

## 2018-06-11 DIAGNOSIS — E03.9 HYPOTHYROIDISM, UNSPECIFIED TYPE: ICD-10-CM

## 2018-06-11 DIAGNOSIS — R79.89 ELEVATED SERUM CREATININE: ICD-10-CM

## 2018-06-11 DIAGNOSIS — K52.9 COLITIS: ICD-10-CM

## 2018-06-11 DIAGNOSIS — Z00.00 ANNUAL PHYSICAL EXAM: Primary | ICD-10-CM

## 2018-06-11 PROCEDURE — 99396 PREV VISIT EST AGE 40-64: CPT | Performed by: FAMILY MEDICINE

## 2018-06-11 RX ORDER — LEVOTHYROXINE SODIUM 0.1 MG/1
100 TABLET ORAL DAILY
Qty: 90 TABLET | Refills: 3 | Status: SHIPPED | OUTPATIENT
Start: 2018-06-11 | End: 2019-05-28 | Stop reason: SDUPTHER

## 2018-06-11 NOTE — PATIENT INSTRUCTIONS
"Don't forget to schedule an eye exam soon.      Try to exercise at least 5 times per week for 30 minutes per session.    Follow up with Gynecology as scheduled and please forward copies of your pap smear and mammogram to this office.        Food Choices to Lower Your Triglycerides  Triglycerides are a type of fat in your blood. High levels of triglycerides can increase the risk of heart disease and stroke. If your triglyceride levels are high, the foods you eat and your eating habits are very important. Choosing the right foods can help lower your triglycerides.  What general guidelines do I need to follow?  · Lose weight if you are overweight.  · Limit or avoid alcohol.  · Fill one half of your plate with vegetables and green salads.  · Limit fruit to two servings a day. Choose fruit instead of juice.  · Make one fourth of your plate whole grains. Look for the word \"whole\" as the first word in the ingredient list.  · Fill one fourth of your plate with lean protein foods.  · Enjoy fatty fish (such as salmon, mackerel, sardines, and tuna) three times a week.  · Choose healthy fats.  · Limit foods high in starch and sugar.  · Eat more home-cooked food and less restaurant, buffet, and fast food.  · Limit fried foods.  · Cook foods using methods other than frying.  · Limit saturated fats.  · Check ingredient lists to avoid foods with partially hydrogenated oils (trans fats) in them.  What foods can I eat?  Grains   Whole grains, such as whole wheat or whole grain breads, crackers, cereals, and pasta. Unsweetened oatmeal, bulgur, barley, quinoa, or brown rice. Corn or whole wheat flour tortillas.  Vegetables   Fresh or frozen vegetables (raw, steamed, roasted, or grilled). Green salads.  Fruits   All fresh, canned (in natural juice), or frozen fruits.  Meat and Other Protein Products   Ground beef (85% or leaner), grass-fed beef, or beef trimmed of fat. Skinless chicken or turkey. Ground chicken or turkey. Pork trimmed " of fat. All fish and seafood. Eggs. Dried beans, peas, or lentils. Unsalted nuts or seeds. Unsalted canned or dry beans.  Dairy   Low-fat dairy products, such as skim or 1% milk, 2% or reduced-fat cheeses, low-fat ricotta or cottage cheese, or plain low-fat yogurt.  Fats and Oils   Tub margarines without trans fats. Light or reduced-fat mayonnaise and salad dressings. Avocado. Safflower, olive, or canola oils. Natural peanut or almond butter.  The items listed above may not be a complete list of recommended foods or beverages. Contact your dietitian for more options.   What foods are not recommended?  Grains   White bread. White pasta. White rice. Cornbread. Bagels, pastries, and croissants. Crackers that contain trans fat.  Vegetables   White potatoes. Corn. Creamed or fried vegetables. Vegetables in a cheese sauce.  Fruits   Dried fruits. Canned fruit in light or heavy syrup. Fruit juice.  Meat and Other Protein Products   Fatty cuts of meat. Ribs, chicken wings, bee, sausage, bologna, salami, chitterlings, fatback, hot dogs, bratwurst, and packaged luncheon meats.  Dairy   Whole or 2% milk, cream, half-and-half, and cream cheese. Whole-fat or sweetened yogurt. Full-fat cheeses. Nondairy creamers and whipped toppings. Processed cheese, cheese spreads, or cheese curds.  Sweets and Desserts   Corn syrup, sugars, honey, and molasses. Candy. Jam and jelly. Syrup. Sweetened cereals. Cookies, pies, cakes, donuts, muffins, and ice cream.  Fats and Oils   Butter, stick margarine, lard, shortening, ghee, or bee fat. Coconut, palm kernel, or palm oils.  Beverages   Alcohol. Sweetened drinks (such as sodas, lemonade, and fruit drinks or punches).  The items listed above may not be a complete list of foods and beverages to avoid. Contact your dietitian for more information.   This information is not intended to replace advice given to you by your health care provider. Make sure you discuss any questions you have with your  health care provider.  Document Released: 10/05/2005 Document Revised: 05/25/2017 Document Reviewed: 10/22/2014  Elsevier Interactive Patient Education © 2017 [employer]

## 2018-06-11 NOTE — PROGRESS NOTES
Subjective   Sierra Teague is a 52 y.o. female. Patient is here today for   Chief Complaint   Patient presents with   • Annual Exam     Physical   • ER follow up            Sierra Teague presents for an Annual Wellness Visit.  she has a history of   Patient Active Problem List   Diagnosis   • Hypothyroidism   • Hyperlipidemia   • Insomnia   • Family history of heart disease   • Degenerative joint disease (DJD) of lumbar spine   • Atypical nevi   • Plantar fasciitis         HPI      Health Habits:  Dental Exam. up to date  Eye Exam. not up to date - needs to schedule  Exercise: 5 times/week.  Current exercise activities include: walking   -her OB/GYN is Dr. He with [facility] and her annual well woman exam with pap and mammogram is scheduled July 27,2018  -Dermatologist appt scheduled later this week  -colonosocpy UTD through 2021  -no mental health concerns  -Tdap and hep A are UTD  -Defers shingles vaccination at this time  -Declines STI screening b/c  and monogamous  -fasting metabolic labs completed 6/5/18, reviewed today and notable for mildly elevated Cr at 1.1, mild hyperlipidemia with  (down from 158 last year)  -pt agrees to get a repeat BMP today to reassess Cr    ER visit follow up:  -pt went to the ER on 5/25/18 for severe epigastric pain   -She had eaten a grilled bratwurst the night before  -CT abdomen showed mild diffuse colitis  -She was discharged home with a course of PO flagyl and some pain medication  -She was only able to complete 5 days of flagyl due to severe fatigue and nausea  -she only took a single dose of the pain medication then did not need it after that  -Nonetheless bdominal pain has now resolved and appetite has returned to normal  -No fevers  -Formed daily BM with no blood      Hypothyroidism well controlled on current dose of levothyroxine with TSH 0.908 last week    The following portions of the patient's history were reviewed and updated as appropriate: allergies,  current medications, past family history, past medical history, past social history, past surgical history and problem list.    Past Medical History:   Diagnosis Date   • Atypical nevi     followed by Dermatology, Dr. Greenberg   • Conjunctivitis    • Degenerative joint disease (DJD) of lumbar spine    • Dysphagia    • Family history of heart disease     follows w/Cardiology, Dr. Fernandez annually   • H/O echocardiogram 07/12/2012   • Hyperlipidemia    • Hypothyroidism    • Insomnia    • Plantar fasciitis     followed by Podiatry     Past Surgical History:   Procedure Laterality Date   • BACK SURGERY  2005    discectomy, lumbar   • BREAST BIOPSY     • COLONOSCOPY  11/22/2013        Family History   Problem Relation Age of Onset   • Arthritis Mother         [id number]   • Diabetes Mother    • Heart disease Mother         quad bypass at age 62   • Hyperlipidemia Mother    • Hypertension Mother    • Thyroid disease Mother    • Stroke Mother 70   • Heart disease Father         CAD   • Hypertension Father    • Thyroid disease Father    • Hyperlipidemia Father    • Cancer Brother 50        throat   • Thyroid disease Brother    • Colon cancer Maternal Grandmother 62   • Heart attack Other    • Thyroid disease Sister    • Diabetes Sister    • Cancer Sister 58        uterine       Allergies   Allergen Reactions   • Azithromycin    • Penicillins       Social History     Social History   • Marital status: Single     Social History Main Topics   • Smoking status: Never Smoker   • Smokeless tobacco: Never Used   • Alcohol use Yes      Comment: Ocassionally    • Drug use: No   • Sexual activity: Yes     Partners: Female     Social History Narrative     at [employer].  Lives at home with her wife and their adopted child      Outpatient Medications Prior to Visit   Medication Sig Dispense Refill   • traZODone (DESYREL) 50 MG tablet Take 1 tablet by mouth Every Night. 90 tablet 3   • levothyroxine (SYNTHROID,  LEVOTHROID) 100 MCG tablet Take 1 tablet by mouth Daily. 30 tablet 1   • aspirin 81 MG EC tablet Take 81 mg by mouth Daily.              Review of Systems   Constitutional: Negative for fatigue and fever.   HENT: Negative for congestion and sore throat.    Respiratory: Negative for cough and shortness of breath.    Cardiovascular: Negative for chest pain and palpitations.   Gastrointestinal: Negative for abdominal pain, anal bleeding, blood in stool, constipation, diarrhea, nausea and vomiting.   Genitourinary: Negative for dysuria, hematuria and pelvic pain.   Musculoskeletal: Negative for arthralgias and myalgias.   Skin: Negative for rash and wound.   Neurological: Negative for dizziness and headaches.   Psychiatric/Behavioral: Negative for dysphoric mood. The patient is not nervous/anxious.          Objective       Vitals:    06/11/18 1414   BP: 122/92   Pulse: 78   Resp: 13   SpO2: 99%   /80 on manual repeat      Physical Exam   Constitutional: She appears well-developed and well-nourished. No distress.   HENT:   Head: Normocephalic and atraumatic.   Mouth/Throat: Oropharynx is clear and moist.   Eyes: Conjunctivae are normal. Pupils are equal, round, and reactive to light.   Neck: No thyromegaly present.   Cardiovascular: Normal rate, regular rhythm and normal heart sounds.  Exam reveals no gallop and no friction rub.    No murmur heard.  Pulmonary/Chest: Effort normal and breath sounds normal. No respiratory distress. She has no wheezes. She has no rhonchi. She has no rales.   Abdominal: Soft. Bowel sounds are normal. She exhibits no distension. There is no hepatosplenomegaly. There is no tenderness. There is no rigidity, no rebound and no guarding.   Lymphadenopathy:     She has no cervical adenopathy.   Skin: Skin is warm and dry.   Psychiatric: She has a normal mood and affect. Her behavior is normal.        CT abdomen and pelvis dated 5/25/18:  FINDINGS: The liver, spleen, pancreas, kidneys, and  adrenal glands  appear within normal limits. The stomach and small bowel are  unremarkable. There is no bowel distention. The colon is diffusely  contracted, and contains very little formed stool. This is suspicious  for mild diffuse colitis. However, no significant colon wall edema is  identified. There are no abnormal masses or fluid collections in the  abdomen or pelvis.     IMPRESSION:  Possible mild diffuse colitis. Otherwise unremarkable CT  scan of the abdomen and pelvis.    ASSESSMENT/PLAN       Problem List Items Addressed This Visit        Endocrine    Colitis  Symptoms have resolved s/p a 5 day course of metronidazole  Abdominal exam WNL today  Pt advised to return to ER if symptoms return    Hypothyroidism    Relevant Medications    Refill levothyroxine (SYNTHROID, LEVOTHROID) 100 MCG tablet  Repeat TSH in 6 months      Other Visit Diagnoses     Annual physical exam    -  Primary    Relevant Medications    levothyroxine (SYNTHROID, LEVOTHROID) 100 MCG tablet    Other Relevant Orders    Basic metabolic panel to reassess Cr  Pt counseled on diet and exercise modifications for mild HLD  Well woman exam scheduled next month for pap smear and mammogram  Colonoscopy UTD      Elevated serum creatinine        Relevant Orders    Repeat Basic metabolic panel            Patient Instructions   Don't forget to schedule an eye exam soon.      Try to exercise at least 5 times per week for 30 minutes per session.    Follow up with Gynecology as scheduled and please forward copies of your pap smear and mammogram to this office.        Food Choices to Lower Your Triglycerides  Triglycerides are a type of fat in your blood. High levels of triglycerides can increase the risk of heart disease and stroke. If your triglyceride levels are high, the foods you eat and your eating habits are very important. Choosing the right foods can help lower your triglycerides.  What general guidelines do I need to follow?  · Lose weight if  "you are overweight.  · Limit or avoid alcohol.  · Fill one half of your plate with vegetables and green salads.  · Limit fruit to two servings a day. Choose fruit instead of juice.  · Make one fourth of your plate whole grains. Look for the word \"whole\" as the first word in the ingredient list.  · Fill one fourth of your plate with lean protein foods.  · Enjoy fatty fish (such as salmon, mackerel, sardines, and tuna) three times a week.  · Choose healthy fats.  · Limit foods high in starch and sugar.  · Eat more home-cooked food and less restaurant, buffet, and fast food.  · Limit fried foods.  · Cook foods using methods other than frying.  · Limit saturated fats.  · Check ingredient lists to avoid foods with partially hydrogenated oils (trans fats) in them.  What foods can I eat?  Grains   Whole grains, such as whole wheat or whole grain breads, crackers, cereals, and pasta. Unsweetened oatmeal, bulgur, barley, quinoa, or brown rice. Corn or whole wheat flour tortillas.  Vegetables   Fresh or frozen vegetables (raw, steamed, roasted, or grilled). Green salads.  Fruits   All fresh, canned (in natural juice), or frozen fruits.  Meat and Other Protein Products   Ground beef (85% or leaner), grass-fed beef, or beef trimmed of fat. Skinless chicken or turkey. Ground chicken or turkey. Pork trimmed of fat. All fish and seafood. Eggs. Dried beans, peas, or lentils. Unsalted nuts or seeds. Unsalted canned or dry beans.  Dairy   Low-fat dairy products, such as skim or 1% milk, 2% or reduced-fat cheeses, low-fat ricotta or cottage cheese, or plain low-fat yogurt.  Fats and Oils   Tub margarines without trans fats. Light or reduced-fat mayonnaise and salad dressings. Avocado. Safflower, olive, or canola oils. Natural peanut or almond butter.  The items listed above may not be a complete list of recommended foods or beverages. Contact your dietitian for more options.   What foods are not recommended?  Grains   White bread. " White pasta. White rice. Cornbread. Bagels, pastries, and croissants. Crackers that contain trans fat.  Vegetables   White potatoes. Corn. Creamed or fried vegetables. Vegetables in a cheese sauce.  Fruits   Dried fruits. Canned fruit in light or heavy syrup. Fruit juice.  Meat and Other Protein Products   Fatty cuts of meat. Ribs, chicken wings, bee, sausage, bologna, salami, chitterlings, fatback, hot dogs, bratwurst, and packaged luncheon meats.  Dairy   Whole or 2% milk, cream, half-and-half, and cream cheese. Whole-fat or sweetened yogurt. Full-fat cheeses. Nondairy creamers and whipped toppings. Processed cheese, cheese spreads, or cheese curds.  Sweets and Desserts   Corn syrup, sugars, honey, and molasses. Candy. Jam and jelly. Syrup. Sweetened cereals. Cookies, pies, cakes, donuts, muffins, and ice cream.  Fats and Oils   Butter, stick margarine, lard, shortening, ghee, or bee fat. Coconut, palm kernel, or palm oils.  Beverages   Alcohol. Sweetened drinks (such as sodas, lemonade, and fruit drinks or punches).  The items listed above may not be a complete list of foods and beverages to avoid. Contact your dietitian for more information.   This information is not intended to replace advice given to you by your health care provider. Make sure you discuss any questions you have with your health care provider.  Document Released: 10/05/2005 Document Revised: 05/25/2017 Document Reviewed: 10/22/2014  [employer] Interactive Patient Education © 2017 [employer]          Return in about 6 months (around 12/11/2018) for Recheck hyperlipidemia with fasting labs.

## 2018-06-12 LAB
BUN SERPL-MCNC: 19 MG/DL (ref 6–24)
BUN/CREAT SERPL: 18 (ref 9–23)
CALCIUM SERPL-MCNC: 9.9 MG/DL (ref 8.7–10.2)
CHLORIDE SERPL-SCNC: 101 MMOL/L (ref 96–106)
CO2 SERPL-SCNC: 22 MMOL/L (ref 20–29)
CREAT SERPL-MCNC: 1.05 MG/DL (ref 0.57–1)
GFR SERPLBLD CREATININE-BSD FMLA CKD-EPI: 60 ML/MIN/1.73
GFR SERPLBLD CREATININE-BSD FMLA CKD-EPI: 70 ML/MIN/1.73
GLUCOSE SERPL-MCNC: 90 MG/DL (ref 65–99)
POTASSIUM SERPL-SCNC: 4.2 MMOL/L (ref 3.5–5.2)
SODIUM SERPL-SCNC: 140 MMOL/L (ref 134–144)

## 2018-06-17 PROBLEM — R79.89 ELEVATED SERUM CREATININE: Status: ACTIVE | Noted: 2018-06-17

## 2018-06-17 NOTE — PROGRESS NOTES
Pt called and renal fxn discussed. Cr mildly elevated but stable over the last year.  Pt does not use any NSAIDs or protein supplements.  She has been working on drinking more water.  Plan to repeat BMP in 6 months and if Cr starts trending up will refer to Nephrology at that time.  Pt agrees with plan.

## 2018-07-12 ENCOUNTER — APPOINTMENT (OUTPATIENT)
Dept: WOMENS IMAGING | Facility: HOSPITAL | Age: 53
End: 2018-07-12

## 2018-07-12 PROCEDURE — 77067 SCR MAMMO BI INCL CAD: CPT | Performed by: RADIOLOGY

## 2018-07-12 PROCEDURE — 77063 BREAST TOMOSYNTHESIS BI: CPT | Performed by: RADIOLOGY

## 2018-08-28 ENCOUNTER — OFFICE VISIT (OUTPATIENT)
Dept: CARDIOLOGY | Facility: CLINIC | Age: 53
End: 2018-08-28

## 2018-08-28 VITALS
SYSTOLIC BLOOD PRESSURE: 156 MMHG | BODY MASS INDEX: 33.87 KG/M2 | HEART RATE: 67 BPM | DIASTOLIC BLOOD PRESSURE: 86 MMHG | WEIGHT: 198.4 LBS | HEIGHT: 64 IN

## 2018-08-28 DIAGNOSIS — I10 ESSENTIAL HYPERTENSION: ICD-10-CM

## 2018-08-28 DIAGNOSIS — E78.5 HYPERLIPIDEMIA, UNSPECIFIED HYPERLIPIDEMIA TYPE: Primary | ICD-10-CM

## 2018-08-28 DIAGNOSIS — R06.09 DYSPNEA ON EXERTION: ICD-10-CM

## 2018-08-28 PROCEDURE — 99214 OFFICE O/P EST MOD 30 MIN: CPT | Performed by: INTERNAL MEDICINE

## 2018-08-28 PROCEDURE — 93000 ELECTROCARDIOGRAM COMPLETE: CPT | Performed by: INTERNAL MEDICINE

## 2018-08-28 RX ORDER — LOSARTAN POTASSIUM AND HYDROCHLOROTHIAZIDE 12.5; 5 MG/1; MG/1
1 TABLET ORAL DAILY
Qty: 90 TABLET | Refills: 3 | Status: SHIPPED | OUTPATIENT
Start: 2018-08-28 | End: 2019-08-13

## 2018-08-28 NOTE — PROGRESS NOTES
Date of Office Visit: 2018  Encounter Provider: Erum Fernandez MD  Place of Service: [facility] CARDIOLOGY  Patient Name: Sierra Teague  :1965      Patient ID:  Sierra Teague is a 53 y.o. female is here for  followup for LAD calcification.         History of Present Illness    She had a stress echocardiogram done  in  for chest pain. This was negative and her ejection fraction was 55-60%. At that  time, all of her valves appeared normal.      She is treated for hypothyroidism.   In her family, her mother had triple bypass at 63. She had a brother with quadruple  bypass at 57, another brother with myocardial infarction at 57, and her father had a  myocardial infarction at 65. Her mother has also had transient ischemic attacks and  hypertension run in the family.      She had vascular screening done in 2014 which was normal. She had a calcium score done  on 2014 which gave her a score of 95 and all that calcium really is in the left  anterior descending artery.          She had laboratory values done 2018 showing , HDL 41, normal CBC, CMP and TSH, except creatinine at 1.1.      she has no tachycardia but has had increasing dyspnea on exertion.  Her blood pressures higher today.  She's had no dizziness or syncope.  She's not been M exercise by walking due to heel spurs but is trying to bike.  She's had no orthopnea.  She denies chest tightness or pressure.  She is taking her medications as directed.  She has gained weight.  She is going through menopause.    Past Medical History:   Diagnosis Date   • Atypical nevi     followed by Dermatology, Dr. Greenberg   • Colitis    • Conjunctivitis    • Degenerative joint disease (DJD) of lumbar spine    • Dysphagia    • Family history of heart disease     follows w/Cardiology, Dr. Fernandez annually   • H/O echocardiogram 2012   • Hyperlipidemia    • Hypothyroidism    • Insomnia    • Plantar fasciitis      followed by Podiatry         Past Surgical History:   Procedure Laterality Date   • BACK SURGERY  2005    discectomy, lumbar   • BREAST BIOPSY     • COLONOSCOPY  11/22/2013       Current Outpatient Prescriptions on File Prior to Visit   Medication Sig Dispense Refill   • levothyroxine (SYNTHROID, LEVOTHROID) 100 MCG tablet Take 1 tablet by mouth Daily. 90 tablet 3   • traZODone (DESYREL) 50 MG tablet Take 1 tablet by mouth Every Night. 90 tablet 3   • aspirin 81 MG EC tablet Take 81 mg by mouth Daily.       No current facility-administered medications on file prior to visit.        Social History     Social History   • Marital status: Single     Spouse name: N/A   • Number of children: N/A   • Years of education: N/A     Occupational History   • Not on file.     Social History Main Topics   • Smoking status: Never Smoker   • Smokeless tobacco: Never Used      Comment: Caffeine use   • Alcohol use Yes      Comment: Ocassionally    • Drug use: No   • Sexual activity: Yes     Partners: Female     Other Topics Concern   • Not on file     Social History Narrative     at [employer].  Lives at home with her wife and their adopted child           Review of Systems   Constitution: Negative.   HENT: Negative for congestion.    Eyes: Negative for vision loss in left eye and vision loss in right eye.   Respiratory: Negative.  Negative for cough, hemoptysis, shortness of breath, sleep disturbances due to breathing, snoring, sputum production and wheezing.    Endocrine: Negative.    Hematologic/Lymphatic: Negative.    Skin: Negative for poor wound healing and rash.   Musculoskeletal: Negative for falls, gout, muscle cramps and myalgias.   Gastrointestinal: Negative for abdominal pain, diarrhea, dysphagia, hematemesis, melena, nausea and vomiting.   Neurological: Negative for excessive daytime sleepiness, dizziness, headaches, light-headedness, loss of balance, seizures and vertigo.   Psychiatric/Behavioral:  "Negative for depression and substance abuse. The patient is not nervous/anxious.        Procedures    ECG 12 Lead  Date/Time: 8/28/2018 1:33 PM  Performed by: MARLENE CHAVARRIA  Authorized by: MARLENE CHAVARRIA   Comparison: compared with previous ECG   Similar to previous ECG  Rhythm: sinus rhythm  Ectopy: atrial premature contractions  T flattening: V2 and V3  Clinical impression: non-specific ECG                Objective:      Vitals:    08/28/18 1322   BP: 156/86   BP Location: Left arm   Pulse: 67   Weight: 90 kg (198 lb 6.4 oz)   Height: 162.6 cm (64\")     Body mass index is 34.06 kg/m².    Physical Exam   Constitutional: She is oriented to person, place, and time. She appears well-developed and well-nourished. No distress.   HENT:   Head: Normocephalic and atraumatic.   Eyes: Conjunctivae are normal. No scleral icterus.   Neck: Neck supple. No JVD present. Carotid bruit is not present. No thyromegaly present.   Cardiovascular: Normal rate, regular rhythm, S1 normal, S2 normal, normal heart sounds and intact distal pulses.   No extrasystoles are present. PMI is not displaced.  Exam reveals no gallop.    No murmur heard.  Pulses:       Carotid pulses are 2+ on the right side, and 2+ on the left side.       Radial pulses are 2+ on the right side, and 2+ on the left side.        Dorsalis pedis pulses are 2+ on the right side, and 2+ on the left side.        Posterior tibial pulses are 2+ on the right side, and 2+ on the left side.   Pulmonary/Chest: Effort normal and breath sounds normal. No respiratory distress. She has no wheezes. She has no rhonchi. She has no rales. She exhibits no tenderness.   Abdominal: Soft. Bowel sounds are normal. She exhibits no distension, no abdominal bruit and no mass. There is no tenderness.   Musculoskeletal: She exhibits no edema or deformity.   Lymphadenopathy:     She has no cervical adenopathy.   Neurological: She is alert and oriented to person, place, and time. No " cranial nerve deficit.   Skin: Skin is warm and dry. No rash noted. She is not diaphoretic. No cyanosis. No pallor. Nails show no clubbing.   Psychiatric: She has a normal mood and affect. Judgment normal.   Vitals reviewed.      Lab Review:       Assessment:      Diagnosis Plan   1. Hyperlipidemia, unspecified hyperlipidemia type     2. Essential hypertension  Stress Test With Myocardial Perfusion One Day    Basic Metabolic Panel   3. Dyspnea on exertion  Stress Test With Myocardial Perfusion One Day     1. Strong family history of cardiovascular disease.   2. Hyperlipidemia.  unable to tolerate statins   3. Hypothyroidism stable.   4. Coronary calcium, 95.  5. Hypertension, start hyzaar 50/12.5mg daily.   6. Dyspnea on exertion, set up stress nuclear.      Plan:       See zoey/NP in 3 months.

## 2018-09-06 ENCOUNTER — HOSPITAL ENCOUNTER (OUTPATIENT)
Dept: CARDIOLOGY | Facility: HOSPITAL | Age: 53
Discharge: HOME | End: 2018-09-06
Attending: INTERNAL MEDICINE | Admitting: INTERNAL MEDICINE

## 2018-09-06 VITALS — WEIGHT: 198 LBS | HEIGHT: 64 IN | BODY MASS INDEX: 33.8 KG/M2

## 2018-09-06 DIAGNOSIS — I10 ESSENTIAL HYPERTENSION: ICD-10-CM

## 2018-09-06 DIAGNOSIS — R06.09 DYSPNEA ON EXERTION: ICD-10-CM

## 2018-09-06 LAB
BH CV NUCLEAR PRIOR STUDY: 2
BH CV STRESS BP STAGE 1: NORMAL
BH CV STRESS COMMENTS STAGE 1: NORMAL
BH CV STRESS DOSE REGADENOSON STAGE 1: 0.4
BH CV STRESS DURATION MIN STAGE 1: 0
BH CV STRESS DURATION SEC STAGE 1: 10
BH CV STRESS HR STAGE 1: 117
BH CV STRESS PROTOCOL 1: NORMAL
BH CV STRESS RECOVERY BP: NORMAL MMHG
BH CV STRESS RECOVERY HR: 91 BPM
BH CV STRESS STAGE 1: 1
LV EF NUC BP: 68 %
MAXIMAL PREDICTED HEART RATE: 165 BPM
PERCENT MAX PREDICTED HR: 70.91 %
STRESS BASELINE BP: NORMAL MMHG
STRESS BASELINE HR: 67 BPM
STRESS PERCENT HR: 83 %
STRESS POST EXERCISE DUR SEC: 10 SEC
STRESS POST PEAK BP: NORMAL MMHG
STRESS POST PEAK HR: 117 BPM
STRESS TARGET HR: 140 BPM

## 2018-09-06 PROCEDURE — A9502 TC99M TETROFOSMIN: HCPCS | Performed by: INTERNAL MEDICINE

## 2018-09-06 PROCEDURE — 78452 HT MUSCLE IMAGE SPECT MULT: CPT | Performed by: INTERNAL MEDICINE

## 2018-09-06 PROCEDURE — 0 TECHNETIUM TETROFOSMIN KIT: Performed by: INTERNAL MEDICINE

## 2018-09-06 PROCEDURE — 78452 HT MUSCLE IMAGE SPECT MULT: CPT

## 2018-09-06 PROCEDURE — 93017 CV STRESS TEST TRACING ONLY: CPT

## 2018-09-06 PROCEDURE — 93018 CV STRESS TEST I&R ONLY: CPT | Performed by: INTERNAL MEDICINE

## 2018-09-06 PROCEDURE — 93016 CV STRESS TEST SUPVJ ONLY: CPT | Performed by: INTERNAL MEDICINE

## 2018-09-06 PROCEDURE — 25010000002 REGADENOSON 0.4 MG/5ML SOLUTION: Performed by: INTERNAL MEDICINE

## 2018-09-06 RX ADMIN — TETROFOSMIN 1 DOSE: 1.38 INJECTION, POWDER, LYOPHILIZED, FOR SOLUTION INTRAVENOUS at 13:16

## 2018-09-06 RX ADMIN — TETROFOSMIN 1 DOSE: 1.38 INJECTION, POWDER, LYOPHILIZED, FOR SOLUTION INTRAVENOUS at 13:58

## 2018-09-06 RX ADMIN — REGADENOSON 0.4 MG: 0.08 INJECTION, SOLUTION INTRAVENOUS at 13:58

## 2018-09-17 ENCOUNTER — HOSPITAL ENCOUNTER (EMERGENCY)
Facility: HOSPITAL | Age: 53
Discharge: HOME | End: 2018-09-17
Attending: EMERGENCY MEDICINE | Admitting: EMERGENCY MEDICINE

## 2018-09-17 ENCOUNTER — APPOINTMENT (OUTPATIENT)
Dept: GENERAL RADIOLOGY | Facility: HOSPITAL | Age: 53
End: 2018-09-17

## 2018-09-17 VITALS
OXYGEN SATURATION: 98 % | WEIGHT: 193 LBS | BODY MASS INDEX: 32.95 KG/M2 | HEART RATE: 64 BPM | DIASTOLIC BLOOD PRESSURE: 78 MMHG | SYSTOLIC BLOOD PRESSURE: 121 MMHG | RESPIRATION RATE: 16 BRPM | TEMPERATURE: 98.7 F | HEIGHT: 64 IN

## 2018-09-17 DIAGNOSIS — M54.32 SCIATICA OF LEFT SIDE: Primary | ICD-10-CM

## 2018-09-17 PROCEDURE — 99283 EMERGENCY DEPT VISIT LOW MDM: CPT

## 2018-09-17 PROCEDURE — 72110 X-RAY EXAM L-2 SPINE 4/>VWS: CPT

## 2018-09-17 RX ORDER — HYDROCODONE BITARTRATE AND ACETAMINOPHEN 7.5; 325 MG/1; MG/1
1 TABLET ORAL EVERY 8 HOURS PRN
Qty: 10 TABLET | Refills: 0 | Status: SHIPPED | OUTPATIENT
Start: 2018-09-17 | End: 2018-09-20

## 2018-09-17 RX ORDER — BACLOFEN 10 MG/1
10 TABLET ORAL 2 TIMES DAILY
Qty: 10 TABLET | Refills: 0 | Status: SHIPPED | OUTPATIENT
Start: 2018-09-17 | End: 2018-09-20

## 2018-09-17 RX ORDER — CYCLOBENZAPRINE HCL 10 MG
10 TABLET ORAL ONCE
Status: COMPLETED | OUTPATIENT
Start: 2018-09-17 | End: 2018-09-17

## 2018-09-17 RX ORDER — METHYLPREDNISOLONE 4 MG/1
TABLET ORAL
Qty: 21 TABLET | Refills: 0 | Status: SHIPPED | OUTPATIENT
Start: 2018-09-17 | End: 2018-11-28

## 2018-09-17 RX ORDER — HYDROCODONE BITARTRATE AND ACETAMINOPHEN 7.5; 325 MG/1; MG/1
1 TABLET ORAL ONCE
Status: COMPLETED | OUTPATIENT
Start: 2018-09-17 | End: 2018-09-17

## 2018-09-17 RX ADMIN — CYCLOBENZAPRINE 10 MG: 10 TABLET, FILM COATED ORAL at 10:17

## 2018-09-17 RX ADMIN — HYDROCODONE BITARTRATE AND ACETAMINOPHEN 1 TABLET: 7.5; 325 TABLET ORAL at 10:17

## 2018-09-17 NOTE — DISCHARGE INSTRUCTIONS
Follow-up with your primary care doctor or your spine surgeon that she seen in the past.  Return immediately if any fever, weakness in extremity, incontinence, or any concerns.

## 2018-09-17 NOTE — ED PROVIDER NOTES
EMERGENCY DEPARTMENT ENCOUNTER    Room Number:  38/38  Time seen: 12:01 PM  PCP: Becky Hanson MD  Historian: patient, family  History Limited By: N/A      HPI:  Chief Complaint: back pain  Context: Sierra Teague is a 53 y.o. female who reports that she has hx of lower back issues with past hx of L4-L5 lumbar discectomy. She presents to the ED c/o left lower back pain that started about 1 week ago. It was initially mild in intensity but worsened yesterday. It radiates to the left upper thigh and is exacerbated by movement (eg rolling over, bending forward, getting out of bed). She denies recent known injury or trauma, trouble walking, sensory loss, motor loss, bladder dysfunction, bowel dysfunction, saddle anesthesia, pain and difficulty with urination, fevers, chills, abd pain, and N/V/D. She reports that she started taking steroid dose pack left over from previous back issues yesterday. Pt has no other complaints at this time.     Location: left lower back  Radiation: left upper thigh  Quality: pain  Intensity/Severity: moderate  Duration: started about 1 week ago  Onset quality: gradual  Timing: intermittent  Progression: worse  Aggravating Factors: movement  (eg rolling over, bending forward, getting out of bed)  Alleviating Factors: remaining still  Previous Episodes: Pt states that she has hx of lower back issues and underwent L4-L5 lumbar discectomy in the past.   Treatment before arrival: Pt reports that she started taking steroid dose pack left over from previous back issues yesterday.  Associated Symptoms: none      PAST MEDICAL HISTORY  Active Ambulatory Problems     Diagnosis Date Noted   • Hypothyroidism 07/26/2016   • Hyperlipidemia 07/26/2016   • Insomnia 07/26/2016   • Family history of heart disease    • Degenerative joint disease (DJD) of lumbar spine    • Atypical nevi    • Plantar fasciitis    • Elevated serum creatinine 06/17/2018     Resolved Ambulatory Problems     Diagnosis  Date Noted   • Conjunctivitis 07/26/2016   • Health care maintenance 07/26/2016   • Diarrhea 08/04/2016     Past Medical History:   Diagnosis Date   • Atypical nevi    • Colitis    • Conjunctivitis    • Degenerative joint disease (DJD) of lumbar spine    • Dysphagia    • Family history of heart disease    • H/O echocardiogram 07/12/2012   • Hyperlipidemia    • Hypothyroidism    • Insomnia    • Plantar fasciitis          PAST SURGICAL HISTORY  Past Surgical History:   Procedure Laterality Date   • BACK SURGERY  2005    discectomy, lumbar   • BREAST BIOPSY     • COLONOSCOPY  11/22/2013         FAMILY HISTORY  Family History   Problem Relation Age of Onset   • Arthritis Mother         rheuamtoid   • Diabetes Mother    • Heart disease Mother         quad bypass at age 62   • Hyperlipidemia Mother    • Hypertension Mother    • Thyroid disease Mother    • Stroke Mother 70   • Heart disease Father         CAD   • Hypertension Father    • Thyroid disease Father    • Hyperlipidemia Father    • Cancer Brother 50        throat   • Thyroid disease Brother    • Colon cancer Maternal Grandmother 62   • Heart attack Other    • Thyroid disease Sister    • Diabetes Sister    • Cancer Sister 58        uterine         SOCIAL HISTORY  Social History     Social History   • Marital status: Single     Spouse name: N/A   • Number of children: N/A   • Years of education: N/A     Occupational History   • Not on file.     Social History Main Topics   • Smoking status: Never Smoker   • Smokeless tobacco: Never Used      Comment: Caffeine use   • Alcohol use Yes      Comment: Ocassionally    • Drug use: No   • Sexual activity: Yes     Partners: Female     Other Topics Concern   • Not on file     Social History Narrative     at [employer]  Lives at home with her wife and their adopted child         ALLERGIES  Azithromycin and Penicillins      REVIEW OF SYSTEMS  Review of Systems   Constitutional: Negative for chills and  fever.   HENT: Negative for sore throat.    Respiratory: Negative for shortness of breath.    Cardiovascular: Negative for chest pain.   Gastrointestinal: Negative for abdominal pain, diarrhea, nausea and vomiting.        No bowel dysfunction   Genitourinary: Negative for difficulty urinating and dysuria.        No bladder dysfunction   Musculoskeletal: Positive for back pain (left lower back pain radiating to the left upper thigh).   Skin: Negative for rash.   Neurological: Negative for dizziness, weakness and numbness.        No saddle anesthesia    Psychiatric/Behavioral: The patient is not nervous/anxious.    All other systems reviewed and are negative.           PHYSICAL EXAM  ED Triage Vitals   Temp Heart Rate Resp BP SpO2   09/17/18 0955 09/17/18 0955 09/17/18 1014 09/17/18 1014 09/17/18 0955   98.7 °F (37.1 °C) 81 16 135/97 97 % WNL      Temp src Heart Rate Source Patient Position BP Location FiO2 (%)   09/17/18 0955 -- -- -- --   Tympanic           Physical Exam   Constitutional: She is oriented to person, place, and time. No distress.   HENT:   Head: Normocephalic.   Neck: Normal range of motion. Neck supple.   Cardiovascular: Normal rate, regular rhythm and normal heart sounds.    Pulses:       Dorsalis pedis pulses are 2+ on the right side, and 2+ on the left side.        Posterior tibial pulses are 2+ on the right side, and 2+ on the left side.   Pulmonary/Chest: Effort normal and breath sounds normal. No respiratory distress.   Abdominal: Soft. There is no tenderness. There is no rebound and no guarding.   Musculoskeletal:   Tenderness over the posterior left pelvic rim, no midline l-spine tenderness, negative straight leg raises bilaterally, NV intact distally to BLE   Neurological: She is alert and oriented to person, place, and time. She has normal motor skills and normal sensation. Gait normal.   Reflex Scores:       Patellar reflexes are 2+ on the right side and 2+ on the left side.       Achilles  reflexes are 2+ on the right side and 2+ on the left side.  No saddle anesthesia, plantar flexion and dorsiflexion are equal bilaterally, sensation and motor function intact to BLE   Skin: Skin is warm and dry.   Psychiatric: Mood and affect normal.   Nursing note and vitals reviewed.        RADIOLOGY  XR Spine Lumbar 4+ View (Final result)   Result time 09/17/18 11:00:20 (independently viewed by me, interpreted by radiologist)    Final result by Joselito Garcia MD (09/17/18 11:00:20)                Narrative:    LUMBAR SPINE SERIES     CLINICAL HISTORY: Low back pain. No known injury. History of discectomy.     A total 5 views were obtained.     Compared to previous lumbar spine x-rays dated 6/30/2010.     There is moderate disc space narrowing at the L4-L5 level with endplate  bony spurring that appears essentially unchanged. The remainder the  lumbar disc spaces are normal in height. All of the vertebral bodies are  normal in height. There is normal alignment. There is no evidence recent  or old fracture..     This report was finalized on 9/17/2018 11:00 AM by Dr. Joselito Garcia M.D.                     Ordered the above noted radiological studies. Reviewed by me in PACS.       PROCEDURES  Procedures          PROGRESS AND CONSULTS  ED Course as of Sep 17 1240   Mon Sep 17, 2018   1008 Left back pain chronically worse x several weeks and acutely worsened yesterday without injury or activity change. Started steroid pack, had filled previously   [JS]      ED Course User Index  [JS] Kandis Mehta, APRN     1205- Pt has already received norco and flexeril for pain. L-spine xray has already been ordered for further evaluation.     1210- Discussed with pt and family about l-spine xray findings (moderate disc space narrowing at the L4-L5 level with endplate bony spurring that appears essentially unchanged, no acute fracture). Informed pt that her sx could possibly be due to left sided sciatica for which she  will be prescribed steroid, short course of pain medication, and muscle relaxer. Educated pt on the nature of sciatica. Instructed to f/u closely with PMD and/or her spine specialist for recheck and for further management. RTER warnings given for any fever, weakness in extremity, incontinence, or any other concerns. Pt understands and agrees with plan. Addressed all questions.        MEDICAL DECISION MAKING      MDM  Number of Diagnoses or Management Options     Amount and/or Complexity of Data Reviewed  Tests in the radiology section of CPT®: reviewed and ordered (l-spine xray- There is moderate disc space narrowing at the L4-L5 level with endplate bony spurring that appears essentially unchanged. The remainder the lumbar disc spaces are normal in height. All of the vertebral bodies are normal in height. There is normal alignment. There is no evidence recent or old fracture.)  Decide to obtain previous medical records or to obtain history from someone other than the patient: yes  Review and summarize past medical records: yes (Pt underwent CT abd/pel in 5/2018 that showed mild colitis and normal aorta. Pt underwent nuclear stress test on 9/6/18 that was low risk study.)  Independent visualization of images, tracings, or specimens: yes    Patient Progress  Patient progress: stable             DIAGNOSIS  Final diagnoses:   Sciatica of left side         DISPOSITION  DISCHARGE    Patient discharged in stable condition.    Reviewed implications of results, diagnosis, meds, responsibility to follow up, warning signs and symptoms of possible worsening, potential complications and reasons to return to ER.    Patient/Family voiced understanding of above instructions.    Discussed plan for discharge, as there is no emergent indication for admission.  Pt/family is agreeable and understands need for follow up and repeat testing.  Pt is aware that discharge does not mean that nothing is wrong but it indicates no emergency is  present and they must continue care with follow-up as given below or physician of their choice.     FOLLOW-UP  Becky Hanson MD  [address] [facility] [address] [phone]      Call in am and follow up in the next 3-4 days. Return immediately if any fever, weakness in extremity, or incontinance      DISCHARGE MEDICATIONS     Medication List      New Prescriptions    baclofen 10 MG tablet  Commonly known as:  LIORESAL  Take 1 tablet by mouth 2 (Two) Times a Day for 10 doses.     HYDROcodone-acetaminophen 7.5-325 MG per tablet  Commonly known as:  NORCO  Take 1 tablet by mouth Every 8 (Eight) Hours As Needed for Moderate Pain    for up to 10 doses.     MethylPREDNISolone 4 MG tablet  Commonly known as:  MEDROL (JONES)  Take as directed on package instructions.            Latest Documented Vital Signs:  As of 12:40 PM  BP- 135/97 HR- 81 Temp- 98.7 °F (37.1 °C) (Tympanic) O2 sat- 97%      --  Documentation assistance provided by ho Cameron for Dr. Kalani MD.  Information recorded by the scribe was done at my direction and has been verified and validated by me.       Ralph Cameron  09/17/18 125       Michael Uriarte MD  09/17/18 1316

## 2018-09-20 ENCOUNTER — OFFICE VISIT (OUTPATIENT)
Dept: FAMILY MEDICINE CLINIC | Facility: CLINIC | Age: 53
End: 2018-09-20

## 2018-09-20 VITALS
WEIGHT: 193 LBS | OXYGEN SATURATION: 98 % | SYSTOLIC BLOOD PRESSURE: 132 MMHG | HEART RATE: 77 BPM | HEIGHT: 64 IN | DIASTOLIC BLOOD PRESSURE: 88 MMHG | BODY MASS INDEX: 32.95 KG/M2 | RESPIRATION RATE: 14 BRPM

## 2018-09-20 DIAGNOSIS — M54.42 LEFT-SIDED LOW BACK PAIN WITH LEFT-SIDED SCIATICA, UNSPECIFIED CHRONICITY: Primary | ICD-10-CM

## 2018-09-20 PROCEDURE — 99213 OFFICE O/P EST LOW 20 MIN: CPT | Performed by: FAMILY MEDICINE

## 2018-09-20 RX ORDER — CYCLOBENZAPRINE HCL 5 MG
5 TABLET ORAL 3 TIMES DAILY PRN
Qty: 30 TABLET | Refills: 1 | Status: SHIPPED | OUTPATIENT
Start: 2018-09-20 | End: 2018-11-28

## 2018-09-20 NOTE — PATIENT INSTRUCTIONS
"You may take up to 3000 mg of Tylenol per day as needed for pain.      Try using heat on your back.      Finish the steroid pack.        Low Back Sprain Rehab  Ask your health care provider which exercises are safe for you. Do exercises exactly as told by your health care provider and adjust them as directed. It is normal to feel mild stretching, pulling, tightness, or discomfort as you do these exercises, but you should stop right away if you feel sudden pain or your pain gets worse. Do not begin these exercises until told by your health care provider.  Stretching and range of motion exercises  These exercises warm up your muscles and joints and improve the movement and flexibility of your back. These exercises also help to relieve pain, numbness, and tingling.  Exercise A: Lumbar rotation    1. Lie on your back on a firm surface and bend your knees.  2. Straighten your arms out to your sides so each arm forms an \"L\" shape with a side of your body (a 90 degree angle).  3. Slowly move both of your knees to one side of your body until you feel a stretch in your lower back. Try not to let your shoulders move off of the floor.  4. Hold for __________ seconds.  5. Tense your abdominal muscles and slowly move your knees back to the starting position.  6. Repeat this exercise on the other side of your body.  Repeat __________ times. Complete this exercise __________ times a day.  Exercise B: Prone extension on elbows    1. Lie on your abdomen on a firm surface.  2. Prop yourself up on your elbows.  3. Use your arms to help lift your chest up until you feel a gentle stretch in your abdomen and your lower back.  ? This will place some of your body weight on your elbows. If this is uncomfortable, try stacking pillows under your chest.  ? Your hips should stay down, against the surface that you are lying on. Keep your hip and back muscles relaxed.  4. Hold for __________ seconds.  5. Slowly relax your upper body and return to " the starting position.  Repeat __________ times. Complete this exercise __________ times a day.  Strengthening exercises  These exercises build strength and endurance in your back. Endurance is the ability to use your muscles for a long time, even after they get tired.  Exercise C: Pelvic tilt  1. Lie on your back on a firm surface. Bend your knees and keep your feet flat.  2. Tense your abdominal muscles. Tip your pelvis up toward the ceiling and flatten your lower back into the floor.  ? To help with this exercise, you may place a small towel under your lower back and try to push your back into the towel.  3. Hold for __________ seconds.  4. Let your muscles relax completely before you repeat this exercise.  Repeat __________ times. Complete this exercise __________ times a day.  Exercise D: Alternating arm and leg raises    1. Get on your hands and knees on a firm surface. If you are on a hard floor, you may want to use padding to cushion your knees, such as an exercise mat.  2. Line up your arms and legs. Your hands should be below your shoulders, and your knees should be below your hips.  3. Lift your left leg behind you. At the same time, raise your right arm and straighten it in front of you.  ? Do not lift your leg higher than your hip.  ? Do not lift your arm higher than your shoulder.  ? Keep your abdominal and back muscles tight.  ? Keep your hips facing the ground.  ? Do not arch your back.  ? Keep your balance carefully, and do not hold your breath.  4. Hold for __________ seconds.  5. Slowly return to the starting position and repeat with your right leg and your left arm.  Repeat __________ times. Complete this exercise __________ times a day.  Exercise E: Abdominal set with straight leg raise    1. Lie on your back on a firm surface.  2. Bend one of your knees and keep your other leg straight.  3. Tense your abdominal muscles and lift your straight leg up, 4-6 inches (10-15 cm) off the  ground.  4. Keep your abdominal muscles tight and hold for __________ seconds.  ? Do not hold your breath.  ? Do not arch your back. Keep it flat against the ground.  5. Keep your abdominal muscles tense as you slowly lower your leg back to the starting position.  6. Repeat with your other leg.  Repeat __________ times. Complete this exercise __________ times a day.  Posture and body mechanics    Body mechanics refers to the movements and positions of your body while you do your daily activities. Posture is part of body mechanics. Good posture and healthy body mechanics can help to relieve stress in your body's tissues and joints. Good posture means that your spine is in its natural S-curve position (your spine is neutral), your shoulders are pulled back slightly, and your head is not tipped forward. The following are general guidelines for applying improved posture and body mechanics to your everyday activities.  Standing    · When standing, keep your spine neutral and your feet about hip-width apart. Keep a slight bend in your knees. Your ears, shoulders, and hips should line up.  · When you do a task in which you  one place for a long time, place one foot up on a stable object that is 2-4 inches (5-10 cm) high, such as a footstool. This helps keep your spine neutral.  Sitting    · When sitting, keep your spine neutral and keep your feet flat on the floor. Use a footrest, if necessary, and keep your thighs parallel to the floor. Avoid rounding your shoulders, and avoid tilting your head forward.  · When working at a desk or a computer, keep your desk at a height where your hands are slightly lower than your elbows. Slide your chair under your desk so you are close enough to maintain good posture.  · When working at a computer, place your monitor at a height where you are looking straight ahead and you do not have to tilt your head forward or downward to look at the screen.  Resting    · When lying down and  resting, avoid positions that are most painful for you.  · If you have pain with activities such as sitting, bending, stooping, or squatting (flexion-based activities), lie in a position in which your body does not bend very much. For example, avoid curling up on your side with your arms and knees near your chest (fetal position).  · If you have pain with activities such as standing for a long time or reaching with your arms (extension-based activities), lie with your spine in a neutral position and bend your knees slightly. Try the following positions:  · Lying on your side with a pillow between your knees.  · Lying on your back with a pillow under your knees.  Lifting    · When lifting objects, keep your feet at least shoulder-width apart and tighten your abdominal muscles.  · Bend your knees and hips and keep your spine neutral. It is important to lift using the strength of your legs, not your back. Do not lock your knees straight out.  · Always ask for help to lift heavy or awkward objects.  This information is not intended to replace advice given to you by your health care provider. Make sure you discuss any questions you have with your health care provider.  Document Released: 12/18/2006 Document Revised: 08/24/2017 Document Reviewed: 09/28/2016  [employer] Patient Education © 2018 [employer]

## 2018-09-20 NOTE — PROGRESS NOTES
Subjective   Sierra Teague is a 53 y.o. female.     Chief Complaint   Patient presents with   • Sciatica     ER follow up       HPI     ER follow up:  -she went to [facility] ER for back pain with L sided sciaticaon 9/17/18  -pt had started steroids and flexeril she had left over at home prior to ER presentation  -she has a hx of L4-L5 discectomy  -x-rays in the ER showed disc space narrowing at L4-L5 but no fractures  -she was treated with norco and flexeril in the ER then discharged home with baclofen, norco, and a medrol dose pack  -she has stopped taking the norco yesterday due to sedation  -she continues to take the steroid and baclofen at night (it makes her very tired)  -today the pain is improving but has not resolved  -no weakness or numbness, no falls, no changes in bowel or bladder function       Review of Systems   Constitutional: Negative for fatigue and fever.   HENT: Negative for congestion and sore throat.    Respiratory: Negative for cough and shortness of breath.    Cardiovascular: Negative for chest pain and palpitations.   Gastrointestinal: Negative for abdominal pain, constipation, diarrhea and nausea.   Genitourinary: Negative for enuresis.   Musculoskeletal: Positive for back pain. Negative for gait problem and neck pain.   Neurological: Negative for dizziness, weakness, numbness and headaches.       The following portions of the patient's history were reviewed and updated as appropriate: allergies, current medications, past family history, past medical history, past social history, past surgical history and problem list.    Past Medical History:   Diagnosis Date   • Atypical nevi     followed by Dermatology, Dr. Greenberg   • Colitis    • Conjunctivitis    • Degenerative joint disease (DJD) of lumbar spine    • Dysphagia    • Family history of heart disease     follows w/Cardiology, Dr. Fernandez annually   • H/O echocardiogram 07/12/2012   • Hyperlipidemia    • Hypothyroidism    • Insomnia    •  Plantar fasciitis     followed by Podiatry         Social History     Social History   • Marital status: Single     Social History Main Topics   • Smoking status: Never Smoker   • Smokeless tobacco: Never Used      Comment: Caffeine use   • Alcohol use Yes      Comment: Ocassionally    • Drug use: No   • Sexual activity: Yes     Partners: Female     Social History Narrative     at [employer].  Lives at home with her wife and their adopted child        Allergies   Allergen Reactions   • Azithromycin    • Penicillins         Outpatient Medications Prior to Visit   Medication Sig Dispense Refill   • aspirin 81 MG EC tablet Take 81 mg by mouth Daily.     • levothyroxine (SYNTHROID, LEVOTHROID) 100 MCG tablet Take 1 tablet by mouth Daily. 90 tablet 3   • losartan-hydrochlorothiazide (HYZAAR) 50-12.5 MG per tablet Take 1 tablet by mouth Daily. 90 tablet 3   • MethylPREDNISolone (MEDROL, JONES,) 4 MG tablet Take as directed on package instructions. 21 tablet 0   • traZODone (DESYREL) 50 MG tablet Take 1 tablet by mouth Every Night. 90 tablet 3   • baclofen (LIORESAL) 10 MG tablet Take 1 tablet by mouth 2 (Two) Times a Day for 10 doses. 10 tablet 0   • HYDROcodone-acetaminophen (NORCO) 7.5-325 MG per tablet Take 1 tablet by mouth Every 8 (Eight) Hours As Needed for Moderate Pain  for up to 10 doses. 10 tablet 0     No facility-administered medications prior to visit.        Objective     Vitals:    09/20/18 1005   BP: 132/88   Pulse: 77   Resp: 14   SpO2: 98%       Physical Exam   Constitutional: She appears well-developed and well-nourished. No distress.   HENT:   Head: Normocephalic and atraumatic.   Cardiovascular: Normal rate, regular rhythm and normal heart sounds.  Exam reveals no gallop and no friction rub.    No murmur heard.  Pulmonary/Chest: Effort normal and breath sounds normal. No respiratory distress. She has no wheezes. She has no rhonchi. She has no rales.   Musculoskeletal:         "Thoracic back: She exhibits spasm.        Lumbar back: She exhibits decreased range of motion and spasm. She exhibits no bony tenderness.   Neurological: She has normal strength. Gait normal.   Skin: Skin is warm and dry.       ASSESSMENT/PLAN             Visit Diagnoses     Left-sided low back pain with left-sided sciatica, unspecified chronicity    -  Primary  D/C norco and baclofen due to sedation  Complete full course of medrol as prescribed at ER    Relevant Medications    Start cyclobenzaprine (FLEXERIL) 5 MG tablet TID prn, pt cautioned about risk for sedation      Other Relevant Orders    Ambulatory Referral to Physical Therapy Evaluate and treat  Home exercises as below  Pt advised to avoid NSAIDs due to hx of mildly elevated Cr; Tylenol ok to use            Patient Instructions   You may take up to 3000 mg of Tylenol per day as needed for pain.      Try using heat on your back.      Finish the steroid pack.        Low Back Sprain Rehab  Ask your health care provider which exercises are safe for you. Do exercises exactly as told by your health care provider and adjust them as directed. It is normal to feel mild stretching, pulling, tightness, or discomfort as you do these exercises, but you should stop right away if you feel sudden pain or your pain gets worse. Do not begin these exercises until told by your health care provider.  Stretching and range of motion exercises  These exercises warm up your muscles and joints and improve the movement and flexibility of your back. These exercises also help to relieve pain, numbness, and tingling.  Exercise A: Lumbar rotation    1. Lie on your back on a firm surface and bend your knees.  2. Straighten your arms out to your sides so each arm forms an \"L\" shape with a side of your body (a 90 degree angle).  3. Slowly move both of your knees to one side of your body until you feel a stretch in your lower back. Try not to let your shoulders move off of the " floor.  4. Hold for __________ seconds.  5. Tense your abdominal muscles and slowly move your knees back to the starting position.  6. Repeat this exercise on the other side of your body.  Repeat __________ times. Complete this exercise __________ times a day.  Exercise B: Prone extension on elbows    1. Lie on your abdomen on a firm surface.  2. Prop yourself up on your elbows.  3. Use your arms to help lift your chest up until you feel a gentle stretch in your abdomen and your lower back.  ? This will place some of your body weight on your elbows. If this is uncomfortable, try stacking pillows under your chest.  ? Your hips should stay down, against the surface that you are lying on. Keep your hip and back muscles relaxed.  4. Hold for __________ seconds.  5. Slowly relax your upper body and return to the starting position.  Repeat __________ times. Complete this exercise __________ times a day.  Strengthening exercises  These exercises build strength and endurance in your back. Endurance is the ability to use your muscles for a long time, even after they get tired.  Exercise C: Pelvic tilt  1. Lie on your back on a firm surface. Bend your knees and keep your feet flat.  2. Tense your abdominal muscles. Tip your pelvis up toward the ceiling and flatten your lower back into the floor.  ? To help with this exercise, you may place a small towel under your lower back and try to push your back into the towel.  3. Hold for __________ seconds.  4. Let your muscles relax completely before you repeat this exercise.  Repeat __________ times. Complete this exercise __________ times a day.  Exercise D: Alternating arm and leg raises    1. Get on your hands and knees on a firm surface. If you are on a hard floor, you may want to use padding to cushion your knees, such as an exercise mat.  2. Line up your arms and legs. Your hands should be below your shoulders, and your knees should be below your hips.  3. Lift your left leg  behind you. At the same time, raise your right arm and straighten it in front of you.  ? Do not lift your leg higher than your hip.  ? Do not lift your arm higher than your shoulder.  ? Keep your abdominal and back muscles tight.  ? Keep your hips facing the ground.  ? Do not arch your back.  ? Keep your balance carefully, and do not hold your breath.  4. Hold for __________ seconds.  5. Slowly return to the starting position and repeat with your right leg and your left arm.  Repeat __________ times. Complete this exercise __________ times a day.  Exercise E: Abdominal set with straight leg raise    1. Lie on your back on a firm surface.  2. Bend one of your knees and keep your other leg straight.  3. Tense your abdominal muscles and lift your straight leg up, 4-6 inches (10-15 cm) off the ground.  4. Keep your abdominal muscles tight and hold for __________ seconds.  ? Do not hold your breath.  ? Do not arch your back. Keep it flat against the ground.  5. Keep your abdominal muscles tense as you slowly lower your leg back to the starting position.  6. Repeat with your other leg.  Repeat __________ times. Complete this exercise __________ times a day.  Posture and body mechanics    Body mechanics refers to the movements and positions of your body while you do your daily activities. Posture is part of body mechanics. Good posture and healthy body mechanics can help to relieve stress in your body's tissues and joints. Good posture means that your spine is in its natural S-curve position (your spine is neutral), your shoulders are pulled back slightly, and your head is not tipped forward. The following are general guidelines for applying improved posture and body mechanics to your everyday activities.  Standing    · When standing, keep your spine neutral and your feet about hip-width apart. Keep a slight bend in your knees. Your ears, shoulders, and hips should line up.  · When you do a task in which you  one  place for a long time, place one foot up on a stable object that is 2-4 inches (5-10 cm) high, such as a footstool. This helps keep your spine neutral.  Sitting    · When sitting, keep your spine neutral and keep your feet flat on the floor. Use a footrest, if necessary, and keep your thighs parallel to the floor. Avoid rounding your shoulders, and avoid tilting your head forward.  · When working at a desk or a computer, keep your desk at a height where your hands are slightly lower than your elbows. Slide your chair under your desk so you are close enough to maintain good posture.  · When working at a computer, place your monitor at a height where you are looking straight ahead and you do not have to tilt your head forward or downward to look at the screen.  Resting    · When lying down and resting, avoid positions that are most painful for you.  · If you have pain with activities such as sitting, bending, stooping, or squatting (flexion-based activities), lie in a position in which your body does not bend very much. For example, avoid curling up on your side with your arms and knees near your chest (fetal position).  · If you have pain with activities such as standing for a long time or reaching with your arms (extension-based activities), lie with your spine in a neutral position and bend your knees slightly. Try the following positions:  · Lying on your side with a pillow between your knees.  · Lying on your back with a pillow under your knees.  Lifting    · When lifting objects, keep your feet at least shoulder-width apart and tighten your abdominal muscles.  · Bend your knees and hips and keep your spine neutral. It is important to lift using the strength of your legs, not your back. Do not lock your knees straight out.  · Always ask for help to lift heavy or awkward objects.  This information is not intended to replace advice given to you by your health care provider. Make sure you discuss any questions you  have with your health care provider.  Document Released: 12/18/2006 Document Revised: 08/24/2017 Document Reviewed: 09/28/2016  [employer] Interactive Patient Education © 2018 [employer]      Return if symptoms worsen or fail to improve, for follow up as scheduled 12/13/18.      Becky Hanson MD  09/20/18

## 2018-09-25 ENCOUNTER — HOSPITAL ENCOUNTER (OUTPATIENT)
Dept: PHYSICAL THERAPY | Facility: HOSPITAL | Age: 53
Setting detail: THERAPIES SERIES
Discharge: HOME | End: 2018-09-25

## 2018-09-25 DIAGNOSIS — M25.60 RANGE OF MOTION DEFICIT: ICD-10-CM

## 2018-09-25 DIAGNOSIS — M54.9 ACUTE BACK PAIN LESS THAN 4 WEEKS DURATION: Primary | ICD-10-CM

## 2018-09-25 DIAGNOSIS — R29.3 POOR POSTURE: ICD-10-CM

## 2018-09-25 PROCEDURE — 97161 PT EVAL LOW COMPLEX 20 MIN: CPT

## 2018-09-25 PROCEDURE — 97530 THERAPEUTIC ACTIVITIES: CPT

## 2018-09-25 PROCEDURE — 97110 THERAPEUTIC EXERCISES: CPT

## 2018-09-25 NOTE — THERAPY EVALUATION
Outpatient Physical Therapy Ortho Initial Evaluation  [facility]     Patient Name: Sierra Teague  : 1965  MRN: [id number]  Today's Date: 2018      Visit Date: 2018    Patient Active Problem List   Diagnosis   • Hypothyroidism   • Hyperlipidemia   • Insomnia   • Family history of heart disease   • Degenerative joint disease (DJD) of lumbar spine   • Atypical nevi   • Plantar fasciitis   • Elevated serum creatinine        Past Medical History:   Diagnosis Date   • Atypical nevi     followed by Dermatology, Dr. Greenberg   • Colitis    • Conjunctivitis    • Degenerative joint disease (DJD) of lumbar spine    • Dysphagia    • Family history of heart disease     follows w/Cardiology, Dr. Fernandez annually   • H/O echocardiogram 2012   • Hyperlipidemia    • Hypothyroidism    • Insomnia    • Plantar fasciitis     followed by Podiatry        Past Surgical History:   Procedure Laterality Date   • BACK SURGERY      discectomy, lumbar   • BREAST BIOPSY     • COLONOSCOPY  2013       Visit Dx:     ICD-10-CM ICD-9-CM   1. Acute back pain less than 4 weeks duration M54.9 724.5   2. Poor posture R29.3 781.92   3. Range of motion deficit M25.60 719.50             Patient History     Row Name 18 0700             History    Chief Complaint Pain  -JA      Type of Pain Back pain  -JA      Date Current Problem(s) Began 18  -      Brief Description of Current Complaint Had severe pain that began on the L side of LB but moved around and now is on the R.  Began last  afer a couplle days of tightness leading up to it.  Went ot [facility] and after te sitting an kneeling standing she could hardly geet back tot the car.  No known mechanism of injury, only thing she can think of is she cut the grass.  Has a 5 y/o, lifts him into car seat. He is 40#.  Recalls the week before her back was so tight she could barely lean forward enough to put on socks. Went to ED a week agoo Monday.  ED gave  her pain  pills and muscle relaxer. Luis A Tylenol, stopped the pain meds, was on  Baclofen but it was changed to flexeril.  MD told her to get and move more and she got TENS unit from [facility] and  heat and it has helped.  Yesterdday was first day baack to work, was sre a little higher up and tight.  Pain has gone down into R knee  but it has shot all over th place but  mainly up into the upper back and tightness.   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 X-ray  -JA      Results of Clinical Tests some OA  -JA      Are you or can you be pregnant No  -JA         Pain     Pain Location Back  -JA      Pain at Present 2  -JA      Pain at Best 2  -JA      Pain at Worst 5  -JA      Pain Description Shooting;Tightness;Sore  -JA      What Performance Factors Make the Current Problem(s) WORSE? getting up from sitting, straightening up before walking  -JA         Fall Risk Assessment    Any falls in the past year: No  -JA         Services    Prior Rehab/Home Health Experiences No  -JA      Are you currently receiving Home Health services No  -JA      Do you plan to receive Home Health services in the near future No  -JA         Daily Activities    Primary Language English  -JA      Are you able to read Yes  -JA      Are you able to write Yes  -JA      How does patient learn best? Listening;Reading;Demonstration;Pictures/Video  -JA      Barriers to learning None  -JA      Pt Participated in POC and Goals Yes  -JA         Safety    Are you being hurt, hit, or frightened by anyone at home or in your life? No  -JA      Are you being neglected by a caregiver No  -JA        User Key  (r) = Recorded By, (t) = Taken By, (c) = Cosigned By    Initials Name Provider Type    Linda Albarado, PT Physical Therapist                PT Ortho     Row Name 09/25/18 0700        Posture/Observations    Posture/Observations Comments mild FHP, hyperlordotic lumbar spine  -JA       Quarter Clearing    Quarter Clearing Lower Quarter Clearing  -JA       Neural Tension Signs- Lower Quarter Clearing    Slump Bilateral:;Negative  -JA    SLR Bilateral:;Negative  -JA       Myotomal Screen- Lower Quarter Clearing    Hip flexion (L2) Right:;3+ (Fair +);Left:;4 (Good)   guarded, decreased ROM R  -JA    Knee extension (L3) Right:;4- (Good -);3+ (Fair +);Left:;4 (Good)  -JA    Ankle DF (L4) Right:;4- (Good -);Left:;4+ (Good +)  -JA    Great toe extension (L5) Right:;4- (Good -);Left:;4+ (Good +)  -JA    Ankle PF (S1) Right:;4- (Good -);Left:;4 (Good)  -JA    Knee flexion (S2) Right:;4- (Good -);Left:;4+ (Good +)  -JA       Lumbar ROM Screen- Lower Quarter Clearing    Lumbar Flexion Impaired   50%, pain begins at 25%  -JA    Lumbar Extension Impaired   30%  -JA    Lumbar Lateral Flexion Impaired   L50% less tight, R 25% tightness  -JA    Lumbar Rotation Impaired   25% guarded/tight  -JA       SI/Hip Screen- Lower Quarter Clearing    Isael's/Jean Carlos's test Right:;Positive;Left:;Negative   mildly +  -JA       Special Tests/Palpation    Special Tests/Palpation Lumbar/SI  -JA       Lumbosacral Palpation    Lumbosacral Palpation? --   tenderness will lum pvm R>L  -JA       Sensation    Sensation WNL? WNL  -JA       Gait/Stairs Assessment/Training    Comment (Gait/Stairs) mild-mod deficits due to guarding for R side LBP, asymmetrical stride incresed wt shift,   -JA      User Key  (r) = Recorded By, (t) = Taken By, (c) = Cosigned By    Initials Name Provider Type    Linda Albarado, PT Physical Therapist                      Therapy Education  Education Details: Worked on transitional movements sit--stand and sit--supine to reduce strain, including bending at hips instead of spine, log roll, forward bending with wide STEFANIE, athletic position.  Recommended supine 90/90 position to reduce strain, benefits of  heat and ice (10 min therapeutic activity)  Given: HEP, Symptoms/condition management, Pain management, Posture/body mechanics  Program: New  How Provided: Verbal, Demonstration, Written  Provided to: Patient  Level of Understanding: Teach back education performed           PT OP Goals     Row Name 09/25/18 0700          PT Short Term Goals    STG Date to Achieve 10/09/18  -OLIVIA     STG 1 Patient will be independent and compliant with initial HEP   -OLIVIA     STG 1 Progress New  -OLIVIA     STG 2 Pt will demonstrate correct posture in sitting and standing.  -OLIVIA     STG 2 Progress New  -JA        Long Term Goals    LTG Date to Achieve 10/25/18  -OLIVIA     LTG 1 Pt will be independent with advanced HEP for strengthening to increase functional mobility.  -OLIVIA     LTG 1 Progress New  -JA     LTG 2 Pt will demonstrate correct body mechanics with ADL's (including lifting her 40# 3 y/o) and work activity (bending to see students' work) to decrease strain on spine.  -OLIVIA     LTG 2 Progress New  -JA     LTG 3 Pt will be able to move through 75% of full cervical AROM without pain or radiating symptoms.  -OLIVIA     LTG 3 Progress New  -JA     LTG 4 Pt will score 20% on Oswestry Disability Index indicating decrease in perceived functional disability.   -OLIVIA     LTG 4 Progress New  -OLIVIA        Time Calculation    PT Goal Re-Cert Due Date 12/25/18  -OLIVIA       User Key  (r) = Recorded By, (t) = Taken By, (c) = Cosigned By    Initials Name Provider Type    Linda Albarado, PT Physical Therapist                PT Assessment/Plan     Row Name 09/25/18 0700          PT Assessment    Functional Limitations Impaired gait;Limitation in home management;Limitations in community activities;Limitations in functional capacity and performance;Performance in leisure activities;Performance in self-care ADL;Performance in work activities  -OLIVIA     Impairments Pain;Range of motion;Posture;Poor body mechanics;Muscle strength;Endurance;Gait  -OLIVIA     Assessment  Comments Pt is an active 52 y/o  with c/o acute episode of LBP with intermittent sciatica that was severe enough to cause her to go to ED 9 days ago.  She has stable clinical presentation with hyperlordotic lumbar posture, c/o pain from 4-6/10, decreased trunk AROM (pain-limited), decreased strength in LE's particularly on R, - SLR, and point tenderness in will lumbar pvm R>L, and pain with transitional movements.  These problems cause impairment to her functional mobility for ADL's and limit her participation in activities with her 3 y/o and caused her to miss a week of work.  She has positive history of L4-5 lumbar discectomy 12 years ago.  She would benefit from skilled therapy services to address problem list and facilitate return to previous level of function.  -OLIVIA     Please refer to paper survey for additional self-reported information Yes  -JA     Rehab Potential Good  -OLIVIA     Patient/caregiver participated in establishment of treatment plan and goals Yes  -OLIVIA     Patient would benefit from skilled therapy intervention Yes  -OLIVIA        PT Plan    PT Frequency 1x/week;2x/week  -OLIVIA     Predicted Duration of Therapy Intervention (Therapy Eval) 4 weeks  -OLIVIA     Planned CPT's? PT EVAL LOW COMPLEXITY: 77887;PT THER PROC EA 15 MIN: 08598;PT THER ACT EA 15 MIN: 63066;PT MANUAL THERAPY EA 15 MIN: 36833;PT SELF CARE/HOME MGMT/TRAIN EA 15: 23093;PT HOT OR COLD PACK TREAT MCARE;PT ELECTRICAL STIM UNATTEND: ;PT TRACTION LUMBAR: 29211;PT ULTRASOUND EA 15 MIN: 29974  -OLIVIA     PT Plan Comments review HEP, posture, body mechanics  -OLIVIA       User Key  (r) = Recorded By, (t) = Taken By, (c) = Cosigned By    Initials Name Provider Type    Linda Albarado, PT Physical Therapist                  Exercises     Row Name 09/25/18 0700             Total Minutes    41659 - PT Therapeutic Exercise Minutes 15  -OLIVIA      93871 - PT Therapeutic Activity Minutes 10   see education tab  -OLIVIA         Exercise 1     Exercise Name 1 t.a. hooklying  -JA      Cueing 1 Verbal;Tactile  -JA      Reps 1 10  -JA      Time 1 3 sec  -JA         Exercise 2    Exercise Name 2 SKC stretch  -JA      Cueing 2 Verbal  -JA      Reps 2 3  -JA      Time 2 10sec  -JA         Exercise 3    Exercise Name 3 Piriformis stretch  -JA      Cueing 3 Verbal  -JA      Reps 3 3  -JA      Time 3 10sec  -JA         Exercise 4    Exercise Name 4 LTR in small range  -JA      Cueing 4 Verbal;Tactile  -JA      Reps 4 5  -JA      Time 4 5sec  -JA      Additional Comments 11-1 O'clock  -JA         Exercise 5    Exercise Name 5 supine 90/90 positioning with traction stool under legs  -JA      Time 5 3 min  -JA        User Key  (r) = Recorded By, (t) = Taken By, (c) = Cosigned By    Initials Name Provider Type    Linda Albarado, PT Physical Therapist                        Outcome Measure Options: Modifed Owestry  Modified Oswestry  Modified Oswestry Score/Comments: 30%      Time Calculation:     Therapy Suggested Charges     Code   Minutes Charges    14444 (CPT®) Hc Pt Neuromusc Re Education Ea 15 Min      09264 (CPT®) Hc Pt Ther Proc Ea 15 Min 15 1    02262 (CPT®) Hc Gait Training Ea 15 Min      79598 (CPT®) Hc Pt Therapeutic Act Ea 15 Min 10 1    10795 (CPT®) Hc Pt Manual Therapy Ea 15 Min      90646 (CPT®) Hc Pt Ther Massage- Per 15 Min      26229 (CPT®) Hc Pt Iontophoresis Ea 15 Min      40405 (CPT®) Hc Pt Elec Stim Ea-Per 15 Min      28036 (CPT®) Hc Pt Ultrasound Ea 15 Min      47957 (CPT®) Hc Pt Self Care/Mgmt/Train Ea 15 Min      61982 (CPT®) Hc Pt Prosthetic (S) Train Initial Encounter, Each 15 Min      10669 (CPT®) Hc Orthotic(S) Mgmt/Train Initial Encounter, Each 15min      98626 (CPT®) Hc Pt Aquatic Therapy Ea 15 Min      70548 (CPT®) Hc Pt Orthotic(S)/Prosthetic(S) Encounter, Each 15 Min      Total  25 2          Start Time: 0700  Stop Time: 0745  Time Calculation (min): 45 min     Therapy Charges for Today     Code Description Service Date  Service Provider Modifiers Qty    64462163395 HC PT THER PROC EA 15 MIN 9/25/2018 Madelyn, Linda VEGA, PT GP 1    04028137828 HC PT EVAL LOW COMPLEXITY 1 9/25/2018 Madelyn, Linda VEGA, PT GP 1    05313384849 HC PT THERAPEUTIC ACT EA 15 MIN 9/25/2018 Madelyn, Linda VEGA, PT GP 1          PT G-Codes  Outcome Measure Options: Modifed Owestry  Modified Oswestry Score/Comments: 30%         Linda Joshi, PT  9/25/2018

## 2018-10-02 ENCOUNTER — HOSPITAL ENCOUNTER (OUTPATIENT)
Dept: PHYSICAL THERAPY | Facility: HOSPITAL | Age: 53
Setting detail: THERAPIES SERIES
Discharge: HOME | End: 2018-10-02

## 2018-10-02 DIAGNOSIS — M25.60 RANGE OF MOTION DEFICIT: ICD-10-CM

## 2018-10-02 DIAGNOSIS — R29.3 POOR POSTURE: ICD-10-CM

## 2018-10-02 DIAGNOSIS — M54.9 ACUTE BACK PAIN LESS THAN 4 WEEKS DURATION: Primary | ICD-10-CM

## 2018-10-02 PROCEDURE — 97110 THERAPEUTIC EXERCISES: CPT

## 2018-10-02 PROCEDURE — 97032 APPL MODALITY 1+ESTIM EA 15: CPT

## 2018-10-02 PROCEDURE — 97140 MANUAL THERAPY 1/> REGIONS: CPT

## 2018-10-02 NOTE — THERAPY TREATMENT NOTE
Outpatient Physical Therapy Ortho Treatment Note  [facility]     Patient Name: Sierra Teague  : 1965  MRN: [id number]  Today's Date: 10/2/2018      Visit Date: 10/02/2018    Visit Dx:    ICD-10-CM ICD-9-CM   1. Acute back pain less than 4 weeks duration M54.9 724.5   2. Poor posture R29.3 781.92   3. Range of motion deficit M25.60 719.50       Patient Active Problem List   Diagnosis   • Hypothyroidism   • Hyperlipidemia   • Insomnia   • Family history of heart disease   • Degenerative joint disease (DJD) of lumbar spine   • Atypical nevi   • Plantar fasciitis   • Elevated serum creatinine        Past Medical History:   Diagnosis Date   • Atypical nevi     followed by Dermatology, Dr. Greenberg   • Colitis    • Conjunctivitis    • Degenerative joint disease (DJD) of lumbar spine    • Dysphagia    • Family history of heart disease     follows w/Cardiology, Dr. Fernandez annually   • H/O echocardiogram 2012   • Hyperlipidemia    • Hypothyroidism    • Insomnia    • Plantar fasciitis     followed by Podiatry        Past Surgical History:   Procedure Laterality Date   • BACK SURGERY      discectomy, lumbar   • BREAST BIOPSY     • COLONOSCOPY  2013                             PT Assessment/Plan     Row Name 10/02/18 0700          PT Assessment    Assessment Comments Pt noted increased soreness at the end of her first week back to work.  Today she reports more pain last night and this morning her back got tighter as she was getting ready this morning.  She stopped and laid down to stretch because she felt tighter.  Has poor  posture habit.  Noted palpable muscle facilitation/spasm and fascial bunching on R LB/lumbosacral region with palpation.  Added soft tissue work to reduce bunching, increase circulation to muscles.  Added Estim with MH in SL to reduce pain/spasm.  -JA        PT Plan    PT Plan Comments assess response to STM, ex, posture work, ES  -JA       User Key  (r) = Recorded By, (t) =  Taken By, (c) = Cosigned By    Initials Name Provider Type    Linda Albarado N, PT Physical Therapist                Modalities     Row Name 10/02/18 0700             Moist Heat    MH Applied Yes  -JA      Location R LB in L SL  -JA      Rx Minutes 15 mins  -JA      MH S/P Rx Yes  -JA         ELECTRICAL STIMULATION    Attended/Unattended Unattended  -JA      Stimulation Type IFC  -JA      Max mAmp 8  -JA      Location/Electrode Placement/Other R lumbosacral pvm/PSIS region  -JA      46120 - PT Electrical Stimulation Attended (Manual) Minutes 15  -JA        User Key  (r) = Recorded By, (t) = Taken By, (c) = Cosigned By    Initials Name Provider Type    Linda Albarado, PT Physical Therapist                Exercises     Row Name 10/02/18 0700             Subjective Comments    Subjective Comments It's a little worse today.  -JA         Subjective Pain    Able to rate subjective pain? yes  -JA      Pre-Treatment Pain Level 8  -JA         Total Minutes    04453 - PT Therapeutic Exercise Minutes 20  -JA      05075 - PT Manual Therapy Minutes 10  -JA         Exercise 1    Exercise Name 1 t.a. hooklying  -JA      Cueing 1 Verbal;Tactile  -JA      Reps 1 10  -JA      Time 1 3 sec  -JA         Exercise 2    Exercise Name 2 SKC stretch  -JA      Cueing 2 Verbal  -JA      Reps 2 3  -JA      Time 2 10sec  -JA         Exercise 3    Exercise Name 3 Piriformis stretch  -JA      Cueing 3 Verbal  -JA      Reps 3 3  -JA      Time 3 10sec  -JA      Additional Comments required cuing for form  -JA         Exercise 4    Exercise Name 4 LTR in small range  -JA      Cueing 4 Verbal;Tactile  -JA      Reps 4 5  -JA      Time 4 5sec  -JA      Additional Comments 11-1 O'clock  -JA         Exercise 5    Exercise Name 5 standing rev shld rolls  -JA      Reps 5 10  -JA         Exercise 6    Exercise Name 6 scap ret w/ good posture  -JA      Reps 6 10  -JA      Additional Comments dont' lock knees  -JA        User Key  (r) = Recorded  By, (t) = Taken By, (c) = Cosigned By    Initials Name Provider Type    Linda Albarado, PT Physical Therapist                        Manual Rx (last 36 hours)      Manual Treatments     Row Name 10/02/18 0700             Total Minutes    49947 - PT Manual Therapy Minutes 10  -OLIVIA         Manual Rx 1    Manual Rx 1 Location R LB in L SL  -OLIVIA      Manual Rx 1 Type STM: Effleurage, gentle myofscial stretching/mobilization, long strokes across lumbar pvm, QL  -OLIVIA      Manual Rx 1 Grade mild-mod  -OLIVIA        User Key  (r) = Recorded By, (t) = Taken By, (c) = Cosigned By    Initials Name Provider Type    Linda Albarado N, PT Physical Therapist                PT OP Goals     Row Name 10/02/18 0700          PT Short Term Goals    STG Date to Achieve 10/09/18  -OLIVIA     STG 1 Patient will be independent and compliant with initial HEP   -OLIVIA     STG 1 Progress Progressing  -OLIVIA     STG 1 Progress Comments minor cuing required for ex's  -OLIVIA     STG 2 Pt will demonstrate correct posture in sitting and standing.  -OLIVIA     STG 2 Progress Ongoing  -OLIVIA     STG 2 Progress Comments worked on posture at mirror, cued to keep knees soft  -OLIVIA        Long Term Goals    LTG Date to Achieve 10/25/18  -OLIVIA     LTG 1 Pt will be independent with advanced HEP for strengthening to increase functional mobility.  -OLIVIA     LTG 1 Progress New  -OLIVIA     LTG 2 Pt will demonstrate correct body mechanics with ADL's (including lifting her 40# 3 y/o) and work activity (bending to see students' work) to decrease strain on spine.  -OLIVIA     LTG 2 Progress Ongoing  -OLIVIA     LTG 2 Progress Comments practiced bending from hips and knees to simulate getting laundry from washer and putting in dryer  -OLIVIA     LTG 3 Pt will be able to move through 75% of full cervical AROM without pain or radiating symptoms.  -OLIVIA     LTG 3 Progress New  -OLIVIA     LTG 4 Pt will score 20% on Oswestry Disability Index indicating decrease in perceived functional disability.   -OLIVIA      LTG 4 Progress New  -OLIVIA       User Key  (r) = Recorded By, (t) = Taken By, (c) = Cosigned By    Initials Name Provider Type    Linda Albarado, PT Physical Therapist          Therapy Education  Education Details: Worked on posture at mirror for visual cues.  Pt locks knees as a habit and we worked on soften them and perform slight posterior pelvic tilt to decrease lumbar lordosis.  Will need reinforcement.  Worked on log roll.  Discussed benefits/goal of estim.  Given: HEP, Symptoms/condition management, Pain management, Posture/body mechanics  Program: Reinforced  How Provided: Verbal, Demonstration  Provided to: Patient  Level of Understanding: Teach back education performed              Time Calculation:   Start Time: 0700  Stop Time: 0750  Time Calculation (min): 50 min  Therapy Suggested Charges     Code   Minutes Charges    94189 (CPT®) Hc Pt Neuromusc Re Education Ea 15 Min      15461 (CPT®) Hc Pt Ther Proc Ea 15 Min 20 1    55225 (CPT®) Hc Gait Training Ea 15 Min      82580 (CPT®) Hc Pt Therapeutic Act Ea 15 Min      55984 (CPT®) Hc Pt Manual Therapy Ea 15 Min 10 1    39728 (CPT®) Hc Pt Ther Massage- Per 15 Min      50674 (CPT®) Hc Pt Iontophoresis Ea 15 Min      34214 (CPT®) Hc Pt Elec Stim Ea-Per 15 Min 15 1    47659 (CPT®) Hc Pt Ultrasound Ea 15 Min      12958 (CPT®) Hc Pt Self Care/Mgmt/Train Ea 15 Min      67078 (CPT®) Hc Pt Prosthetic (S) Train Initial Encounter, Each 15 Min      07218 (CPT®) Hc Orthotic(S) Mgmt/Train Initial Encounter, Each 15min      29412 (CPT®) Hc Pt Aquatic Therapy Ea 15 Min      55406 (CPT®) Hc Pt Orthotic(S)/Prosthetic(S) Encounter, Each 15 Min       (CPT®) Hc Pt Electrical Stim Unattended      Total  45 3        Therapy Charges for Today     Code Description Service Date Service Provider Modifiers Qty    45954177074 HC PT THER PROC EA 15 MIN 10/2/2018 Linda Joshi, PT GP 1    71637106520 HC PT MANUAL THERAPY EA 15 MIN 10/2/2018 Linda Joshi, PT GP 1     44403950944 HC PT ELEC STIM EA-PER 15 MIN 10/2/2018 Linda Joshi, PT GP 1    40130789737 HC PT HOT OR COLD PACK TREAT MCARE 10/2/2018 Linda Joshi, PT GP 1                    Linda Joshi, PT  10/2/2018

## 2018-10-09 ENCOUNTER — APPOINTMENT (OUTPATIENT)
Dept: PHYSICAL THERAPY | Facility: HOSPITAL | Age: 53
End: 2018-10-09

## 2018-10-16 ENCOUNTER — APPOINTMENT (OUTPATIENT)
Dept: PHYSICAL THERAPY | Facility: HOSPITAL | Age: 53
End: 2018-10-16

## 2018-10-23 ENCOUNTER — APPOINTMENT (OUTPATIENT)
Dept: PHYSICAL THERAPY | Facility: HOSPITAL | Age: 53
End: 2018-10-23

## 2018-11-28 ENCOUNTER — OFFICE VISIT (OUTPATIENT)
Dept: CARDIOLOGY | Facility: CLINIC | Age: 53
End: 2018-11-28

## 2018-11-28 VITALS
BODY MASS INDEX: 33.97 KG/M2 | DIASTOLIC BLOOD PRESSURE: 72 MMHG | WEIGHT: 199 LBS | HEIGHT: 64 IN | HEART RATE: 57 BPM | SYSTOLIC BLOOD PRESSURE: 124 MMHG

## 2018-11-28 DIAGNOSIS — I10 ESSENTIAL HYPERTENSION: Primary | ICD-10-CM

## 2018-11-28 DIAGNOSIS — R93.1 AGATSTON CORONARY ARTERY CALCIUM SCORE LESS THAN 100: ICD-10-CM

## 2018-11-28 PROCEDURE — 99213 OFFICE O/P EST LOW 20 MIN: CPT | Performed by: NURSE PRACTITIONER

## 2018-11-28 PROCEDURE — 93000 ELECTROCARDIOGRAM COMPLETE: CPT | Performed by: NURSE PRACTITIONER

## 2018-11-28 NOTE — PROGRESS NOTES
Date of Office Visit: 2018  Encounter Provider: MINGO Wills  Place of Service: [facility] CARDIOLOGY  Patient Name: Sierra Teague  :1965    Chief Complaint   Patient presents with   • Hypertension   • Hyperlipidemia   • Follow-up   :     HPI: Sierra Teague is a 53 y.o. female is a patient of Dr. Hackett. I am seeing her today and have reviewed his record.     Her past medical history is significant of Hypertention, hyperlipidemia, Jovita history of coronary artery disease and myocardial infarction.  She also has a history of hypothyroidism.    She had vascular screening completed in 2014 which was normal.  She also had calcium scoring completed in 2014 which gave her a score of 95.  She had calcium in the left anterior descending artery.  In 2018 her LDL was 118.  Creatinine was 1.1 that time.  TSH was normal.    She presented in 2018 with complaint of dyspnea on exertion.  Her blood pressure was elevated and she was started on Hyzaar 50/12.5 mg daily.  Myocardial perfusion study completed on 2018 showed no evidence of ischemia was low risk.  EF 68%    Patient presents today and reports that her blood pressures at home but the much better averaging in the 120 systolic.  Her shortness of breath with exertion and resolves spontaneously.  She denies chest pain tightness pressure, palpitation, edema, lightheadedness, near syncope, or syncope.  She walks for 30-40 minutes 3 days a week outside and is not having any limiting symptoms with that.  She does have chronic back pain which occasionally radiates to her shoulder on the right side.  She believes she has a pinched nerve. overall reports doing well.      Allergies   Allergen Reactions   • Azithromycin Palpitations   • Penicillins Hives       Past Medical History:   Diagnosis Date   • Atypical nevi     followed by Dermatology, Dr. 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 Fernandez annually   • H/O echocardiogram 07/12/2012   • Hyperlipidemia    • Hypothyroidism    • Insomnia    • Plantar fasciitis     followed by Podiatry       Past Surgical History:   Procedure Laterality Date   • BACK SURGERY  2005    discectomy, lumbar   • BREAST BIOPSY     • COLONOSCOPY  11/22/2013         Family and social history reviewed.     ROS  All other systems were reviewed and are negative          Objective:     Vitals:    11/28/18 1501   BP: 124/72   BP Location: Left arm   Patient Position: Sitting   Pulse: 57   Weight: 90.3 kg (199 lb)   Height: 162.6 cm (64\")     Body mass index is 34.16 kg/m².    PHYSICAL EXAM:  Physical Exam   Constitutional: She is oriented to person, place, and time. She appears well-developed and well-nourished. No distress.   HENT:   Head: Normocephalic.   Eyes: Conjunctivae are normal.   Neck: Normal range of motion. No JVD present.   Cardiovascular: Normal rate, regular rhythm, normal heart sounds and intact distal pulses.   No murmur heard.  Pulses:       Carotid pulses are 2+ on the right side, and 2+ on the left side.       Radial pulses are 2+ on the right side, and 2+ on the left side.        Posterior tibial pulses are 2+ on the right side, and 2+ on the left side.   Pulmonary/Chest: Effort normal and breath sounds normal. No respiratory distress. She has no wheezes. She has no rhonchi. She has no rales. She exhibits no tenderness.   Abdominal: Soft. Bowel sounds are normal. She exhibits no distension.   Musculoskeletal: Normal range of motion. She exhibits no edema.   Neurological: She is alert and oriented to person, place, and time.   Skin: Skin is warm, dry and intact. No rash noted. She is not diaphoretic. No cyanosis.   Psychiatric: She has a normal mood and affect. Her behavior is normal. Judgment and thought content normal.         ECG 12 " Lead  Date/Time: 11/28/2018 4:02 PM  Performed by: Abi Jain APRN  Authorized by: Abi Jain APRN   Comparison: compared with previous ECG from 8/28/2018  Similar to previous ECG  Rhythm: sinus rhythm  Rate: bradycardic  BPM: 57  T flattening: III and V3  QRS axis: normal  Clinical impression: normal ECG            Current Outpatient Medications   Medication Sig Dispense Refill   • aspirin 81 MG EC tablet Take 81 mg by mouth Daily.     • levothyroxine (SYNTHROID, LEVOTHROID) 100 MCG tablet Take 1 tablet by mouth Daily. 90 tablet 3   • losartan-hydrochlorothiazide (HYZAAR) 50-12.5 MG per tablet Take 1 tablet by mouth Daily. 90 tablet 3   • traZODone (DESYREL) 50 MG tablet Take 1 tablet by mouth Every Night. 90 tablet 3     No current facility-administered medications for this visit.      Assessment:       Diagnosis Plan   1. Essential hypertension     2. Agatston coronary artery calcium score less than 100          Orders Placed This Encounter   Procedures   • ECG 12 Lead     This order was created via procedure documentation         Plan:         1.  Coronary calcium score of 95 mainly involving the left anterior descending.  Negative perfusion study September 2018.  No angina or exertional symptoms.  EKG unchanged continue the same.  She is taking aspirin 81 mg daily  2.  Hypertension at goal now on Hyzaar 50/12.5 mg.  She will continue to monitor at home and call if she has elevated reading  3.  Hyperlipidemia unable to tolerate statins  4.  Hypothyroidism on replacement therapy  5.  Family history of cardiovascular disease  6.  Dyspnea on exertion systems all spontaneously.      Follow up in one year      It has been a pleasure to participate in this patient's care.      Thank you,  MINGO Wills      **Dragon Disclaimer:**  Much of this encounter note is an electronic transcription/translation of spoken language to printed text. The electronic translation of spoken language may permit erroneous,  or at times, nonsensical words or phrases to be inadvertently transcribed. Although I have reviewed the note for such errors, some may still exist.

## 2018-12-13 ENCOUNTER — OFFICE VISIT (OUTPATIENT)
Dept: FAMILY MEDICINE CLINIC | Facility: CLINIC | Age: 53
End: 2018-12-13

## 2018-12-13 VITALS
HEART RATE: 72 BPM | RESPIRATION RATE: 13 BRPM | SYSTOLIC BLOOD PRESSURE: 124 MMHG | HEIGHT: 64 IN | OXYGEN SATURATION: 98 % | WEIGHT: 198 LBS | DIASTOLIC BLOOD PRESSURE: 84 MMHG | BODY MASS INDEX: 33.8 KG/M2

## 2018-12-13 DIAGNOSIS — Z23 NEED FOR VACCINATION: ICD-10-CM

## 2018-12-13 DIAGNOSIS — E78.5 HYPERLIPIDEMIA, UNSPECIFIED HYPERLIPIDEMIA TYPE: Primary | ICD-10-CM

## 2018-12-13 DIAGNOSIS — E03.9 HYPOTHYROIDISM, UNSPECIFIED TYPE: ICD-10-CM

## 2018-12-13 DIAGNOSIS — I10 ESSENTIAL HYPERTENSION: ICD-10-CM

## 2018-12-13 LAB
ALBUMIN SERPL-MCNC: 4.3 G/DL (ref 3.5–5.2)
ALBUMIN/GLOB SERPL: 1.6 G/DL
ALP SERPL-CCNC: 56 U/L (ref 39–117)
ALT SERPL-CCNC: 36 U/L (ref 1–33)
AST SERPL-CCNC: 26 U/L (ref 1–32)
BILIRUB SERPL-MCNC: 0.7 MG/DL (ref 0.1–1.2)
BUN SERPL-MCNC: 25 MG/DL (ref 6–20)
BUN/CREAT SERPL: 26 (ref 7–25)
CALCIUM SERPL-MCNC: 10.3 MG/DL (ref 8.6–10.5)
CHLORIDE SERPL-SCNC: 101 MMOL/L (ref 98–107)
CHOLEST SERPL-MCNC: 203 MG/DL (ref 0–200)
CO2 SERPL-SCNC: 27.3 MMOL/L (ref 22–29)
CREAT SERPL-MCNC: 0.96 MG/DL (ref 0.57–1)
GLOBULIN SER CALC-MCNC: 2.7 GM/DL
GLUCOSE SERPL-MCNC: 103 MG/DL (ref 65–99)
HDLC SERPL-MCNC: 52 MG/DL (ref 40–60)
LDLC SERPL CALC-MCNC: 131 MG/DL (ref 0–100)
POTASSIUM SERPL-SCNC: 5.1 MMOL/L (ref 3.5–5.2)
PROT SERPL-MCNC: 7 G/DL (ref 6–8.5)
SODIUM SERPL-SCNC: 140 MMOL/L (ref 136–145)
TRIGL SERPL-MCNC: 98 MG/DL (ref 0–150)
TSH SERPL DL<=0.005 MIU/L-ACNC: 0.42 MIU/ML (ref 0.27–4.2)
VLDLC SERPL CALC-MCNC: 19.6 MG/DL (ref 5–40)

## 2018-12-13 PROCEDURE — 90471 IMMUNIZATION ADMIN: CPT | Performed by: FAMILY MEDICINE

## 2018-12-13 PROCEDURE — 90674 CCIIV4 VAC NO PRSV 0.5 ML IM: CPT | Performed by: FAMILY MEDICINE

## 2018-12-13 PROCEDURE — 90632 HEPA VACCINE ADULT IM: CPT | Performed by: FAMILY MEDICINE

## 2018-12-13 PROCEDURE — 99214 OFFICE O/P EST MOD 30 MIN: CPT | Performed by: FAMILY MEDICINE

## 2018-12-13 PROCEDURE — 90472 IMMUNIZATION ADMIN EACH ADD: CPT | Performed by: FAMILY MEDICINE

## 2018-12-13 NOTE — PROGRESS NOTES
Subjective   Sierra Teague is a 53 y.o. female.     Chief Complaint   Patient presents with   • Hyperlipidemia     Follow up       HPI     Hyperlipidemia:  - about 6 months ago  -she is fasting for follow up labs today  -she has tried atorvastatin in the past but did not tolerate it due to myalgias  -she is walking for exercise 3 days per week for 30 min per session    Hypothyroidism:  -taking levothyroxine 100 mcg daily  -last TSH 0.9 about 6 months ago    HTN:  -taking losartan-HCTZ 50-12.5 mg daily as prescribed by her Cardiologist, Dr. Fernandez  -no dizziness or cough  -no chest pain, dizziness, headaches, or acute vision changes    She would like to get flu and second Hep A vaccine today    Pap smear UTD at [facility]       Review of Systems   Constitutional: Negative for fatigue and fever.   HENT: Negative for congestion and sore throat.    Respiratory: Negative for cough and shortness of breath.    Cardiovascular: Negative for chest pain, palpitations and leg swelling.   Gastrointestinal: Positive for abdominal pain (reflux yesterday). Negative for constipation, diarrhea and nausea.   Skin: Negative for rash and wound.   Neurological: Negative for dizziness and headaches.   Psychiatric/Behavioral: Negative for dysphoric mood. The patient is not nervous/anxious.        The following portions of the patient's history were reviewed and updated as appropriate: allergies, current medications, past family history, past medical history, past social history, past surgical history and problem list.    Past Medical History:   Diagnosis Date   • Atypical nevi     followed by Dermatology, Dr. Greenberg   • Colitis    • Conjunctivitis    • Degenerative joint disease (DJD) of lumbar spine    • Dysphagia    • Family history of heart disease     follows w/Cardiology, Dr. Fernandez annually   • H/O echocardiogram 07/12/2012   • Hyperlipidemia    • Hypothyroidism    • Insomnia    • Plantar fasciitis     followed by  Podiatry     Past Surgical History:   Procedure Laterality Date   • BACK SURGERY  2005    discectomy, lumbar   • BREAST BIOPSY     • COLONOSCOPY  11/22/2013     Family History   Problem Relation Age of Onset   • Arthritis Mother         [id number]   • Diabetes Mother    • Heart disease Mother         quad bypass at age 62   • Hyperlipidemia Mother    • Hypertension Mother    • Thyroid disease Mother    • Stroke Mother 70   • Heart attack Mother    • Heart disease Father         CAD   • Hypertension Father    • Thyroid disease Father    • Hyperlipidemia Father    • Heart attack Father    • Cancer Brother 50        throat   • Thyroid disease Brother    • Colon cancer Maternal Grandmother 62   • Thyroid disease Sister    • Diabetes Sister    • Cancer Sister 58        uterine   • Throat cancer Brother      Social History     Tobacco Use   • Smoking status: Never Smoker   • Smokeless tobacco: Never Used   • Tobacco comment: Caffeine use   Substance and Sexual Activity   • Alcohol use: Yes     Comment: Ocassionally    • Drug use: No   • Sexual activity: Yes     Partners: Female   Other Topics Concern   • Not on file   Social History Narrative     [facility] [employer]  Lives at home with her wife and their adopted child        Allergies   Allergen Reactions   • Azithromycin Palpitations   • Penicillins Hives        Outpatient Medications Prior to Visit   Medication Sig Dispense Refill   • aspirin 81 MG EC tablet Take 81 mg by mouth Daily.     • levothyroxine (SYNTHROID, LEVOTHROID) 100 MCG tablet Take 1 tablet by mouth Daily. 90 tablet 3   • losartan-hydrochlorothiazide (HYZAAR) 50-12.5 MG per tablet Take 1 tablet by mouth Daily. 90 tablet 3   • traZODone (DESYREL) 50 MG tablet Take 1 tablet by mouth Every Night. 90 tablet 3     No facility-administered medications prior to visit.        Objective     Vitals:    12/13/18 0813   BP: 124/84   Pulse: 72   Resp: 13   SpO2: 98%       Physical Exam    Constitutional: She appears well-developed and well-nourished. No distress.   HENT:   Head: Normocephalic and atraumatic.   Neck: No thyromegaly present.   Cardiovascular: Normal rate, regular rhythm and normal heart sounds. Exam reveals no gallop and no friction rub.   No murmur heard.  Pulmonary/Chest: Effort normal and breath sounds normal. No respiratory distress. She has no wheezes. She has no rhonchi. She has no rales.   Abdominal: Soft. Bowel sounds are normal. She exhibits no distension. There is no tenderness.   Lymphadenopathy:     She has no cervical adenopathy.   Skin: Skin is warm and dry.   Psychiatric: She has a normal mood and affect. Her behavior is normal.       ASSESSMENT/PLAN       Problem List Items Addressed This Visit        Cardiovascular and Mediastinum    Hyperlipidemia - Primary    Relevant Orders    Lipid panel  Consider trial of pravastatin or crestor if worsening       Endocrine    Hypothyroidism    Relevant Orders    TSH  Continue levothyroxine      Other Visit Diagnoses     Need for vaccination        Relevant Orders    Flucelvax Quad=>4Years ([id number]) (Completed)    Hepatitis A Vaccine Adult IM (Completed)    Essential hypertension    well controlled, continue current medication regimen      Relevant Orders    Comprehensive metabolic panel    TSH        Pap smear records requested    Patient Instructions   Try over the counter Zantac as needed for reflux.    Food Choices for Gastroesophageal Reflux Disease, Adult  When you have gastroesophageal reflux disease (GERD), the foods you eat and your eating habits are very important. Choosing the right foods can help ease the discomfort of GERD. Consider working with a diet and nutrition specialist (dietitian) to help you make healthy food choices.  What general guidelines should I follow?  Eating plan  · Choose healthy foods low in fat, such as fruits, vegetables, whole grains, low-fat dairy products, and lean meat, fish, and  poultry.  · Eat frequent, small meals instead of three large meals each day. Eat your meals slowly, in a relaxed setting. Avoid bending over or lying down until 2-3 hours after eating.  · Limit high-fat foods such as fatty meats or fried foods.  · Limit your intake of oils, butter, and shortening to less than 8 teaspoons each day.  · Avoid the following:  ? Foods that cause symptoms. These may be different for different people. Keep a food diary to keep track of foods that cause symptoms.  ? Alcohol.  ? Drinking large amounts of liquid with meals.  ? Eating meals during the 2-3 hours before bed.  · Cook foods using methods other than frying. This may include baking, grilling, or broiling.  Lifestyle    · Maintain a healthy weight. Ask your health care provider what weight is healthy for you. If you need to lose weight, work with your health care provider to do so safely.  · Exercise for at least 30 minutes on 5 or more days each week, or as told by your health care provider.  · Avoid wearing clothes that fit tightly around your waist and chest.  · Do not use any products that contain nicotine or tobacco, such as cigarettes and e-cigarettes. If you need help quitting, ask your health care provider.  · Sleep with the head of your bed raised. Use a wedge under the mattress or blocks under the bed frame to raise the head of the bed.  What foods are not recommended?  The items listed may not be a complete list. Talk with your dietitian about what dietary choices are best for you.  Grains  Pastries or quick breads with added fat. French toast.  Vegetables  Deep fried vegetables. French fries. Any vegetables prepared with added fat. Any vegetables that cause symptoms. For some people this may include tomatoes and tomato products, chili peppers, onions and garlic, and horseradish.  Fruits  Any fruits prepared with added fat. Any fruits that cause symptoms. For some people this may include citrus fruits, such as oranges,  grapefruit, pineapple, and jessica.  Meats and other protein foods  High-fat meats, such as fatty beef or pork, hot dogs, ribs, ham, sausage, salami and bee. Fried meat or protein, including fried fish and fried chicken. Nuts and nut butters.  Dairy  Whole milk and chocolate milk. Sour cream. Cream. Ice cream. Cream cheese. Milk shakes.  Beverages  Coffee and tea, with or without caffeine. Carbonated beverages. Sodas. Energy drinks. Fruit juice made with acidic fruits (such as orange or grapefruit). Tomato juice. Alcoholic drinks.  Fats and oils  Butter. Margarine. Shortening. Ghee.  Sweets and desserts  Chocolate and cocoa. Donuts.  Seasoning and other foods  Pepper. Peppermint and spearmint. Any condiments, herbs, or seasonings that cause symptoms. For some people, this may include kennedy, hot sauce, or vinegar-based salad dressings.  Summary  · When you have gastroesophageal reflux disease (GERD), food and lifestyle choices are very important to help ease the discomfort of GERD.  · Eat frequent, small meals instead of three large meals each day. Eat your meals slowly, in a relaxed setting. Avoid bending over or lying down until 2-3 hours after eating.  · Limit high-fat foods such as fatty meat or fried foods.  This information is not intended to replace advice given to you by your health care provider. Make sure you discuss any questions you have with your health care provider.  Document Released: 12/18/2006 Document Revised: 12/19/2017 Document Reviewed: 12/19/2017  [employer] Interactive Patient Education © 2018 [employer]      Return in about 6 months (around 6/13/2019) for Annual.      Becky Hanson MD  12/13/18

## 2018-12-13 NOTE — PATIENT INSTRUCTIONS
Try over the counter Zantac as needed for reflux.    Food Choices for Gastroesophageal Reflux Disease, Adult  When you have gastroesophageal reflux disease (GERD), the foods you eat and your eating habits are very important. Choosing the right foods can help ease the discomfort of GERD. Consider working with a diet and nutrition specialist (dietitian) to help you make healthy food choices.  What general guidelines should I follow?  Eating plan  · Choose healthy foods low in fat, such as fruits, vegetables, whole grains, low-fat dairy products, and lean meat, fish, and poultry.  · Eat frequent, small meals instead of three large meals each day. Eat your meals slowly, in a relaxed setting. Avoid bending over or lying down until 2-3 hours after eating.  · Limit high-fat foods such as fatty meats or fried foods.  · Limit your intake of oils, butter, and shortening to less than 8 teaspoons each day.  · Avoid the following:  ? Foods that cause symptoms. These may be different for different people. Keep a food diary to keep track of foods that cause symptoms.  ? Alcohol.  ? Drinking large amounts of liquid with meals.  ? Eating meals during the 2-3 hours before bed.  · Cook foods using methods other than frying. This may include baking, grilling, or broiling.  Lifestyle    · Maintain a healthy weight. Ask your health care provider what weight is healthy for you. If you need to lose weight, work with your health care provider to do so safely.  · Exercise for at least 30 minutes on 5 or more days each week, or as told by your health care provider.  · Avoid wearing clothes that fit tightly around your waist and chest.  · Do not use any products that contain nicotine or tobacco, such as cigarettes and e-cigarettes. If you need help quitting, ask your health care provider.  · Sleep with the head of your bed raised. Use a wedge under the mattress or blocks under the bed frame to raise the head of the bed.  What foods are not  recommended?  The items listed may not be a complete list. Talk with your dietitian about what dietary choices are best for you.  Grains  Pastries or quick breads with added fat. French toast.  Vegetables  Deep fried vegetables. French fries. Any vegetables prepared with added fat. Any vegetables that cause symptoms. For some people this may include tomatoes and tomato products, chili peppers, onions and garlic, and horseradish.  Fruits  Any fruits prepared with added fat. Any fruits that cause symptoms. For some people this may include citrus fruits, such as oranges, grapefruit, pineapple, and jessica.  Meats and other protein foods  High-fat meats, such as fatty beef or pork, hot dogs, ribs, ham, sausage, salami and bee. Fried meat or protein, including fried fish and fried chicken. Nuts and nut butters.  Dairy  Whole milk and chocolate milk. Sour cream. Cream. Ice cream. Cream cheese. Milk shakes.  Beverages  Coffee and tea, with or without caffeine. Carbonated beverages. Sodas. Energy drinks. Fruit juice made with acidic fruits (such as orange or grapefruit). Tomato juice. Alcoholic drinks.  Fats and oils  Butter. Margarine. Shortening. Ghee.  Sweets and desserts  Chocolate and cocoa. Donuts.  Seasoning and other foods  Pepper. Peppermint and spearmint. Any condiments, herbs, or seasonings that cause symptoms. For some people, this may include kennedy, hot sauce, or vinegar-based salad dressings.  Summary  · When you have gastroesophageal reflux disease (GERD), food and lifestyle choices are very important to help ease the discomfort of GERD.  · Eat frequent, small meals instead of three large meals each day. Eat your meals slowly, in a relaxed setting. Avoid bending over or lying down until 2-3 hours after eating.  · Limit high-fat foods such as fatty meat or fried foods.  This information is not intended to replace advice given to you by your health care provider. Make sure you discuss any questions you have  with your health care provider.  Document Released: 12/18/2006 Document Revised: 12/19/2017 Document Reviewed: 12/19/2017  [employer] Interactive Patient Education © 2018 [employer]

## 2019-04-05 DIAGNOSIS — F51.01 PRIMARY INSOMNIA: ICD-10-CM

## 2019-04-05 RX ORDER — TRAZODONE HYDROCHLORIDE 50 MG/1
TABLET ORAL
Qty: 90 TABLET | Refills: 0 | Status: SHIPPED | OUTPATIENT
Start: 2019-04-05 | End: 2019-07-16 | Stop reason: SDUPTHER

## 2019-05-28 DIAGNOSIS — E03.9 HYPOTHYROIDISM, UNSPECIFIED TYPE: ICD-10-CM

## 2019-05-28 DIAGNOSIS — Z00.00 ANNUAL PHYSICAL EXAM: ICD-10-CM

## 2019-05-28 RX ORDER — LEVOTHYROXINE SODIUM 0.1 MG/1
100 TABLET ORAL DAILY
Qty: 90 TABLET | Refills: 1 | Status: SHIPPED | OUTPATIENT
Start: 2019-05-28 | End: 2019-10-10 | Stop reason: SDUPTHER

## 2019-07-16 DIAGNOSIS — F51.01 PRIMARY INSOMNIA: ICD-10-CM

## 2019-07-18 RX ORDER — TRAZODONE HYDROCHLORIDE 50 MG/1
TABLET ORAL
Qty: 30 TABLET | Refills: 0 | Status: SHIPPED | OUTPATIENT
Start: 2019-07-18 | End: 2019-08-13 | Stop reason: SDUPTHER

## 2019-08-13 ENCOUNTER — OFFICE VISIT (OUTPATIENT)
Dept: FAMILY MEDICINE CLINIC | Facility: CLINIC | Age: 54
End: 2019-08-13

## 2019-08-13 VITALS
BODY MASS INDEX: 34.66 KG/M2 | TEMPERATURE: 97.9 F | RESPIRATION RATE: 16 BRPM | HEART RATE: 61 BPM | DIASTOLIC BLOOD PRESSURE: 94 MMHG | OXYGEN SATURATION: 99 % | HEIGHT: 64 IN | SYSTOLIC BLOOD PRESSURE: 133 MMHG | WEIGHT: 203 LBS

## 2019-08-13 DIAGNOSIS — I10 ESSENTIAL HYPERTENSION: ICD-10-CM

## 2019-08-13 DIAGNOSIS — F51.04 PSYCHOPHYSIOLOGICAL INSOMNIA: ICD-10-CM

## 2019-08-13 DIAGNOSIS — E03.9 ACQUIRED HYPOTHYROIDISM: Primary | ICD-10-CM

## 2019-08-13 DIAGNOSIS — Z12.31 ENCOUNTER FOR SCREENING MAMMOGRAM FOR MALIGNANT NEOPLASM OF BREAST: ICD-10-CM

## 2019-08-13 DIAGNOSIS — E78.5 HYPERLIPIDEMIA, UNSPECIFIED HYPERLIPIDEMIA TYPE: ICD-10-CM

## 2019-08-13 PROCEDURE — 99214 OFFICE O/P EST MOD 30 MIN: CPT | Performed by: FAMILY MEDICINE

## 2019-08-13 RX ORDER — TRAZODONE HYDROCHLORIDE 50 MG/1
50 TABLET ORAL EVERY EVENING
Qty: 90 TABLET | Refills: 1 | Status: SHIPPED | OUTPATIENT
Start: 2019-08-13 | End: 2019-10-04 | Stop reason: SDUPTHER

## 2019-08-13 NOTE — PROGRESS NOTES
Subjective   Sierra Teague is a 53 y.o. female.     53-year-old female presents today as a new patient to me to discuss her hypothyroidism and hypertension and hyperlipidemia.    Hyperlipidemia: Patient had myalgias with atorvastatin in the past.  Most recent lipid panel was done in December 2018 which showed mild elevation LDL at 131.  Is continue to work on diet and exercise.    Hypertension: Patient was on losartan-HCTZ 50-12.5 mg a day however she stopped taking this because she kept getting messages from pharmacy about it being recalled.  Reports she was started on this by her cardiologist Dr. Fernandez may be a little over a year or 2 ago.  Seen cardiology due to family history of heart disease.  Has follow-up with cardiology in December 2019.  Blood pressure in the office today 133/94. Patient denies chest pain shortness of breath headache with vision changes or lower extremity swelling.    Hypothyroidism: Patient is on levothyroxine 100 mcg a day.  TSH was last checked December 2018 and was normal at 0.423.    Insomnia: Under control of trazodone 50 mg nightly.  Feels her insomnia secondary to anxiety which this medication also helps.  Denies feeling groggy in the morning.    Mammogram: Due for this; would like that ordered today. Sees [facility] for pap; says she is due for this but will confirm with [facility].             The following portions of the patient's history were reviewed and updated as appropriate: allergies, current medications, past family history, past medical history, past social history, past surgical history and problem list.    Review of Systems   Constitutional: Negative for chills and fever.   HENT: Negative for congestion.    Respiratory: Negative for apnea and shortness of breath.    Cardiovascular: Negative for chest pain, palpitations and leg swelling.   Psychiatric/Behavioral: Negative for sleep disturbance and depressed mood. The patient is not nervous/anxious.         Objective   Physical Exam   Constitutional: She appears well-developed and well-nourished.   HENT:   Head: Normocephalic and atraumatic.   Nose: No congestion.   Mouth/Throat: Uvula is midline, oropharynx is clear and moist and mucous membranes are normal.   Eyes: EOM are normal. Pupils are equal, round, and reactive to light.   Cardiovascular: Normal rate and regular rhythm.   No murmur heard.  Pulmonary/Chest: Effort normal and breath sounds normal. No stridor. No respiratory distress. She has no wheezes. She has no rales.   Neurological: She is alert.   Skin: Skin is warm.   Psychiatric: She has a normal mood and affect. Her behavior is normal.               Assessment/Plan     Sierra was seen today for establish care, hypertension and hyperlipidemia.    Diagnoses and all orders for this visit:    Acquired hypothyroidism  -     TSH Rfx On Abnormal To Free T4    Hyperlipidemia, unspecified hyperlipidemia type    Essential hypertension  -     Comprehensive Metabolic Panel    Psychophysiological insomnia  -     traZODone (DESYREL) 50 MG tablet; Take 1 tablet by mouth Every Evening.    Encounter for screening mammogram for malignant neoplasm of breast  -     Mammo Screening Bilateral With CAD      Follow-up labs; return to clinic in 6 weeks with blood pressure log.  Patient also contact her cardiologist Dr. Fernandez so they are aware that she is on taking her medication anymore in case they have any other thoughts on this.  Continue trazodone as dosed.  Follow-up screening mammogram.  Return to clinic in 6 weeks or sooner if needed.  If any chest pain shortness of breath headache vision changes go to ER.  Patient expressed understanding and agreeable to plan.

## 2019-08-14 LAB
ALBUMIN SERPL-MCNC: 4.5 G/DL (ref 3.5–5.2)
ALBUMIN/GLOB SERPL: 1.7 G/DL
ALP SERPL-CCNC: 58 U/L (ref 39–117)
ALT SERPL-CCNC: 24 U/L (ref 1–33)
AST SERPL-CCNC: 19 U/L (ref 1–32)
BILIRUB SERPL-MCNC: 0.6 MG/DL (ref 0.2–1.2)
BUN SERPL-MCNC: 25 MG/DL (ref 6–20)
BUN/CREAT SERPL: 22.9 (ref 7–25)
CALCIUM SERPL-MCNC: 9.6 MG/DL (ref 8.6–10.5)
CHLORIDE SERPL-SCNC: 104 MMOL/L (ref 98–107)
CO2 SERPL-SCNC: 28.9 MMOL/L (ref 22–29)
CREAT SERPL-MCNC: 1.09 MG/DL (ref 0.57–1)
GLOBULIN SER CALC-MCNC: 2.7 GM/DL
GLUCOSE SERPL-MCNC: 98 MG/DL (ref 65–99)
POTASSIUM SERPL-SCNC: 4.7 MMOL/L (ref 3.5–5.2)
PROT SERPL-MCNC: 7.2 G/DL (ref 6–8.5)
SODIUM SERPL-SCNC: 143 MMOL/L (ref 136–145)
TSH SERPL DL<=0.005 MIU/L-ACNC: 0.61 MIU/ML (ref 0.27–4.2)

## 2019-08-15 DIAGNOSIS — R94.4 DECREASED GFR: Primary | ICD-10-CM

## 2019-08-16 NOTE — PROGRESS NOTES
Essentially normal labs except slight dip in kidney function. This is similar to what her kidney function was like 1 year ago. We can recheck in 6 months.

## 2019-10-04 ENCOUNTER — OFFICE VISIT (OUTPATIENT)
Dept: FAMILY MEDICINE CLINIC | Facility: CLINIC | Age: 54
End: 2019-10-04

## 2019-10-04 VITALS
DIASTOLIC BLOOD PRESSURE: 70 MMHG | OXYGEN SATURATION: 98 % | BODY MASS INDEX: 33.39 KG/M2 | SYSTOLIC BLOOD PRESSURE: 108 MMHG | WEIGHT: 195.6 LBS | HEIGHT: 64 IN | HEART RATE: 68 BPM | RESPIRATION RATE: 14 BRPM

## 2019-10-04 DIAGNOSIS — Z23 NEED FOR VACCINATION: ICD-10-CM

## 2019-10-04 DIAGNOSIS — Z00.00 ANNUAL PHYSICAL EXAM: Primary | ICD-10-CM

## 2019-10-04 DIAGNOSIS — F51.04 PSYCHOPHYSIOLOGICAL INSOMNIA: ICD-10-CM

## 2019-10-04 LAB
ERYTHROCYTE [DISTWIDTH] IN BLOOD BY AUTOMATED COUNT: 12.9 % (ref 12.3–15.4)
HCT VFR BLD AUTO: 42.8 % (ref 34–46.6)
HGB BLD-MCNC: 14.3 G/DL (ref 12–15.9)
MCH RBC QN AUTO: 30 PG (ref 26.6–33)
MCHC RBC AUTO-ENTMCNC: 33.4 G/DL (ref 31.5–35.7)
MCV RBC AUTO: 89.9 FL (ref 79–97)
PLATELET # BLD AUTO: 302 10*3/MM3 (ref 140–450)
RBC # BLD AUTO: 4.76 10*6/MM3 (ref 3.77–5.28)
WBC # BLD AUTO: 4.92 10*3/MM3 (ref 3.4–10.8)

## 2019-10-04 PROCEDURE — 90674 CCIIV4 VAC NO PRSV 0.5 ML IM: CPT | Performed by: NURSE PRACTITIONER

## 2019-10-04 PROCEDURE — 99396 PREV VISIT EST AGE 40-64: CPT | Performed by: NURSE PRACTITIONER

## 2019-10-04 PROCEDURE — 90471 IMMUNIZATION ADMIN: CPT | Performed by: NURSE PRACTITIONER

## 2019-10-04 RX ORDER — TRAZODONE HYDROCHLORIDE 50 MG/1
50 TABLET ORAL EVERY EVENING
Qty: 90 TABLET | Refills: 1 | Status: SHIPPED | OUTPATIENT
Start: 2019-10-04 | End: 2020-04-09 | Stop reason: DRUGHIGH

## 2019-10-04 NOTE — PATIENT INSTRUCTIONS
I will call you with your lab results.   Please call with any questions or concerns.     Annual Wellness Exam  Personal Prevention Plan of Service     Date of Office Visit:  10/04/2019  Encounter Provider:  MINGO Chaidez  Place of Service:  [facility]  Patient Name: Sierra Teague  :  1965    As part of the Annual Wellness portion of your visit today, we are providing you with this personalized preventive plan of services (PPPS). This plan is based upon recommendations of the [place] Preventive Services Task Force (USPSTF) and the Advisory Committee on Immunization Practices (ACIP).    This lists the preventive care services that should be considered, and provides dates of when you are due. Items listed as completed are up-to-date and do not require any further intervention.    Health Maintenance   Topic Date Due   • ZOSTER VACCINE (1 of 2) 2013   • MAMMOGRAM  2019   • INFLUENZA VACCINE  2019   • LIPID PANEL  2019   • ANNUAL PHYSICAL  10/05/2020   • PAP SMEAR  2021   • COLONOSCOPY  2021   • TDAP/TD VACCINES (2 - Td) 2028   • HEPATITIS C SCREENING  Completed       No orders of the defined types were placed in this encounter.      Return in about 6 months.

## 2019-10-04 NOTE — PROGRESS NOTES
Preventive Exam    History of Present Illness: Sierra Teague is a 54 y.o. here for check up and review of routine health maintenance. she states she is doing well and has no concerns. She was previously seen by Dr. Hanson, but is new to me.     Past medical history, surgical history and family history have been reviewed.       Review of Systems   Constitutional: Negative for appetite change, chills, fatigue and fever.   HENT: Negative for congestion, dental problem, postnasal drip, sinus pressure and sore throat.    Eyes: Negative.  Negative for blurred vision, double vision and visual disturbance.        Wears glasses.    Respiratory: Negative for cough, chest tightness and shortness of breath.    Cardiovascular: Negative for chest pain, palpitations and leg swelling.   Gastrointestinal: Negative for abdominal pain, constipation, diarrhea, nausea and vomiting.   Endocrine: Negative.  Negative for cold intolerance and heat intolerance.   Genitourinary: Negative.  Negative for breast discharge, breast lump, breast pain, dysuria and frequency.   Musculoskeletal: Negative for arthralgias, back pain, joint swelling and myalgias.   Skin: Negative.    Allergic/Immunologic: Negative.  Negative for environmental allergies and food allergies.   Neurological: Negative for dizziness, weakness, numbness and headache.   Hematological: Negative.  Does not bruise/bleed easily.   Psychiatric/Behavioral: Negative.  Negative for depressed mood. The patient is not nervous/anxious.        PHYSICAL EXAM    Vitals:    10/04/19 0937   BP: 108/70   Pulse: 68   Resp: 14   SpO2: 98%         Physical Exam   Constitutional: She is oriented to person, place, and time. She appears well-developed and well-nourished.   HENT:   Head: Normocephalic and atraumatic.   Right Ear: External ear normal.   Left Ear: External ear normal.   Nose: Nose normal.   Mouth/Throat: Oropharynx is clear and moist.   Eyes: Conjunctivae and EOM are normal. Pupils are  equal, round, and reactive to light.   Neck: Normal range of motion. Neck supple. No thyromegaly present.   Cardiovascular: Normal rate, regular rhythm, normal heart sounds and intact distal pulses.   No murmur heard.  Pulmonary/Chest: Effort normal and breath sounds normal.   Abdominal: Soft. Bowel sounds are normal. She exhibits no distension. There is no tenderness.   Musculoskeletal: Normal range of motion. She exhibits no edema or deformity.   Lymphadenopathy:     She has no cervical adenopathy.   Neurological: She is alert and oriented to person, place, and time. No cranial nerve deficit.   Skin: Skin is warm and dry.   Psychiatric: She has a normal mood and affect. Her behavior is normal. Judgment and thought content normal.   Nursing note and vitals reviewed.      Procedures    Sierra was seen today for annual exam.    Diagnoses and all orders for this visit:    Annual physical exam  -     CBC (No Diff)  -     Lipid Panel With / Chol / HDL Ratio  -     Comprehensive Metabolic Panel  -     TSH    Need for vaccination  -     Flucelvax Quad (Vial) =>4 yrs (9257-5059)    Psychophysiological insomnia  -     traZODone (DESYREL) 50 MG tablet; Take 1 tablet by mouth Every Evening.        Problems Addressed this Visit        Other    Insomnia    Relevant Medications    traZODone (DESYREL) 50 MG tablet      Other Visit Diagnoses     Annual physical exam    -  Primary    Relevant Orders    CBC (No Diff)    Lipid Panel With / Chol / HDL Ratio    Comprehensive Metabolic Panel    TSH    Need for vaccination        Relevant Orders    Flucelvax Quad (Vial) =>4 yrs (4268-4612)          Routine health maintenance reviewed and discussed with Sierra Teague.  Pt is following low carb diet and is exercising at least 5 days a weeks.    Patient Instructions     I will call you with your lab results.   Please call with any questions or concerns.     Annual Wellness Exam  Personal Prevention Plan of Service     Date of Office Visit:   10/04/2019  Encounter Provider:  MINGO Chaidez  Place of Service:  [facility]  Patient Name: Sierra Teague  :  1965    As part of the Annual Wellness portion of your visit today, we are providing you with this personalized preventive plan of services (PPPS). This plan is based upon recommendations of the [place] Preventive Services Task Force (USPSTF) and the Advisory Committee on Immunization Practices (ACIP).    This lists the preventive care services that should be considered, and provides dates of when you are due. Items listed as completed are up-to-date and do not require any further intervention.    Health Maintenance   Topic Date Due   • ZOSTER VACCINE (1 of 2) 2013   • MAMMOGRAM  2019   • INFLUENZA VACCINE  2019   • LIPID PANEL  2019   • ANNUAL PHYSICAL  10/05/2020   • PAP SMEAR  2021   • COLONOSCOPY  2021   • TDAP/TD VACCINES (2 - Td) 2028   • HEPATITIS C SCREENING  Completed       No orders of the defined types were placed in this encounter.      Return in about 1 year (around 10/4/2020) for Annual.

## 2019-10-05 LAB
ALBUMIN SERPL-MCNC: 4.7 G/DL (ref 3.5–5.2)
ALBUMIN/GLOB SERPL: 1.6 G/DL
ALP SERPL-CCNC: 55 U/L (ref 39–117)
ALT SERPL-CCNC: 32 U/L (ref 1–33)
AST SERPL-CCNC: 22 U/L (ref 1–32)
BILIRUB SERPL-MCNC: 0.7 MG/DL (ref 0.2–1.2)
BUN SERPL-MCNC: 28 MG/DL (ref 6–20)
BUN/CREAT SERPL: 32.9 (ref 7–25)
CALCIUM SERPL-MCNC: 10.1 MG/DL (ref 8.6–10.5)
CHLORIDE SERPL-SCNC: 104 MMOL/L (ref 98–107)
CHOLEST SERPL-MCNC: 221 MG/DL (ref 0–200)
CHOLEST/HDLC SERPL: 4.33 {RATIO}
CO2 SERPL-SCNC: 25.8 MMOL/L (ref 22–29)
CREAT SERPL-MCNC: 0.85 MG/DL (ref 0.57–1)
GLOBULIN SER CALC-MCNC: 2.9 GM/DL
GLUCOSE SERPL-MCNC: 99 MG/DL (ref 65–99)
HDLC SERPL-MCNC: 51 MG/DL (ref 40–60)
LDLC SERPL CALC-MCNC: 149 MG/DL (ref 0–100)
POTASSIUM SERPL-SCNC: 4.4 MMOL/L (ref 3.5–5.2)
PROT SERPL-MCNC: 7.6 G/DL (ref 6–8.5)
SODIUM SERPL-SCNC: 141 MMOL/L (ref 136–145)
TRIGL SERPL-MCNC: 103 MG/DL (ref 0–150)
TSH SERPL DL<=0.005 MIU/L-ACNC: 0.11 UIU/ML (ref 0.27–4.2)
VLDLC SERPL CALC-MCNC: 20.6 MG/DL

## 2019-10-07 ENCOUNTER — TELEPHONE (OUTPATIENT)
Dept: FAMILY MEDICINE CLINIC | Facility: CLINIC | Age: 54
End: 2019-10-07

## 2019-10-10 DIAGNOSIS — E03.9 ACQUIRED HYPOTHYROIDISM: Primary | ICD-10-CM

## 2019-10-10 RX ORDER — LEVOTHYROXINE SODIUM 88 UG/1
88 TABLET ORAL DAILY
Qty: 90 TABLET | Refills: 1 | Status: SHIPPED | OUTPATIENT
Start: 2019-10-10 | End: 2020-03-31

## 2019-10-15 ENCOUNTER — RESULTS ENCOUNTER (OUTPATIENT)
Dept: FAMILY MEDICINE CLINIC | Facility: CLINIC | Age: 54
End: 2019-10-15

## 2019-10-15 DIAGNOSIS — E03.9 ACQUIRED HYPOTHYROIDISM: ICD-10-CM

## 2019-11-12 LAB — TSH SERPL DL<=0.005 MIU/L-ACNC: 1.33 UIU/ML (ref 0.27–4.2)

## 2020-01-12 ENCOUNTER — RESULTS ENCOUNTER (OUTPATIENT)
Dept: FAMILY MEDICINE CLINIC | Facility: CLINIC | Age: 55
End: 2020-01-12

## 2020-01-12 DIAGNOSIS — R94.4 DECREASED GFR: ICD-10-CM

## 2020-03-31 DIAGNOSIS — E03.9 ACQUIRED HYPOTHYROIDISM: ICD-10-CM

## 2020-03-31 RX ORDER — LEVOTHYROXINE SODIUM 88 UG/1
88 TABLET ORAL DAILY
Qty: 90 TABLET | Refills: 1 | Status: SHIPPED | OUTPATIENT
Start: 2020-03-31 | End: 2020-08-19 | Stop reason: SDUPTHER

## 2020-04-09 ENCOUNTER — TELEMEDICINE (OUTPATIENT)
Dept: FAMILY MEDICINE CLINIC | Facility: CLINIC | Age: 55
End: 2020-04-09

## 2020-04-09 VITALS — BODY MASS INDEX: 33.46 KG/M2 | WEIGHT: 196 LBS | HEIGHT: 64 IN

## 2020-04-09 DIAGNOSIS — G47.00 INSOMNIA, UNSPECIFIED TYPE: ICD-10-CM

## 2020-04-09 DIAGNOSIS — E03.9 HYPOTHYROIDISM, UNSPECIFIED TYPE: Primary | ICD-10-CM

## 2020-04-09 PROCEDURE — 99213 OFFICE O/P EST LOW 20 MIN: CPT | Performed by: NURSE PRACTITIONER

## 2020-04-09 RX ORDER — TRAZODONE HYDROCHLORIDE 100 MG/1
100 TABLET ORAL NIGHTLY
Qty: 90 TABLET | Refills: 1 | Status: SHIPPED | OUTPATIENT
Start: 2020-04-09 | End: 2021-01-28

## 2020-04-09 NOTE — PROGRESS NOTES
Subjective   Sierra Teague is a 54 y.o. female.     History of Present Illness   Patient presents via televisit today for follow-up for hypothyroidism, chronic, ongoing, controlled. Pt is taking levothyroxine 88mcg 1 p.o. QAM.  She denies any increased anxiety, hair loss or weight gain.    Pt also presents for follow-up for insomnia, chronic, ongoing, currently not controlled. She is taking trazodone 50mg 1 p.o. QHS. She states that the medication helps her to get to sleep, however, she does not sleep through the night.  I discussed increasing her medication dosage and patient is in agreement with this.     The following portions of the patient's history were reviewed and updated as appropriate: allergies, current medications, past family history, past medical history, past social history, past surgical history and problem list.    Review of Systems   Constitutional: Negative for chills, fatigue, fever, unexpected weight gain and unexpected weight loss.   Respiratory: Negative for cough and shortness of breath.    Cardiovascular: Negative for chest pain, palpitations and leg swelling.   Neurological: Negative for dizziness and headache.   Hematological: Negative.    Psychiatric/Behavioral: Positive for sleep disturbance. The patient is not nervous/anxious.        Objective   Physical Exam   Constitutional: She is oriented to person, place, and time. She appears well-developed and well-nourished.   HENT:   Head: Normocephalic and atraumatic.   Eyes: Pupils are equal, round, and reactive to light. Conjunctivae and EOM are normal.   Pulmonary/Chest: Effort normal.   Neurological: She is alert and oriented to person, place, and time.   Psychiatric: She has a normal mood and affect. Her behavior is normal. Judgment and thought content normal.     This vist was completed via televisit due to the restrictions of the COVID-19 pandemic. All issues as below were discussed and addressed. If it was felt that the patient should  be evaluated in clinic, then they were directed there.  The patient verbally consented to visit.   There were no vitals filed for this visit.  Body mass index is 33.64 kg/m².    Procedures    Assessment/Plan   Problems Addressed this Visit        Endocrine    Hypothyroidism - Primary    Relevant Orders    TSH       Other    Insomnia    Relevant Medications    traZODone (DESYREL) 100 MG tablet    Other Relevant Orders    Comprehensive Metabolic Panel        Return in about 6 months (around 10/9/2020) for Annual, Labs.            Patient Instructions   I will call you with your lab results.    Call with any question or concerns.    Return in about 6 months (around 10/9/2020) for Annual, Labs.    Insomnia  Insomnia is a sleep disorder that makes it difficult to fall asleep or stay asleep. Insomnia can cause fatigue, low energy, difficulty concentrating, mood swings, and poor performance at work or school.  There are three different ways to classify insomnia:  · Difficulty falling asleep.  · Difficulty staying asleep.  · Waking up too early in the morning.  Any type of insomnia can be long-term (chronic) or short-term (acute). Both are common. Short-term insomnia usually lasts for three months or less. Chronic insomnia occurs at least three times a week for longer than three months.  What are the causes?  Insomnia may be caused by another condition, situation, or substance, such as:  · Anxiety.  · Certain medicines.  · Gastroesophageal reflux disease (GERD) or other gastrointestinal conditions.  · Asthma or other breathing conditions.  · Restless legs syndrome, sleep apnea, or other sleep disorders.  · Chronic pain.  · Menopause.  · Stroke.  · Abuse of alcohol, tobacco, or illegal drugs.  · Mental health conditions, such as depression.  · Caffeine.  · Neurological disorders, such as Alzheimer's disease.  · An overactive thyroid (hyperthyroidism).  Sometimes, the cause of insomnia may not be known.  What increases the  risk?  Risk factors for insomnia include:  · Gender. Women are affected more often than men.  · Age. Insomnia is more common as you get older.  · Stress.  · Lack of exercise.  · Irregular work schedule or working night shifts.  · Traveling between different time zones.  · Certain medical and mental health conditions.  What are the signs or symptoms?  If you have insomnia, the main symptom is having trouble falling asleep or having trouble staying asleep. This may lead to other symptoms, such as:  · Feeling fatigued or having low energy.  · Feeling nervous about going to sleep.  · Not feeling rested in the morning.  · Having trouble concentrating.  · Feeling irritable, anxious, or depressed.  How is this diagnosed?  This condition may be diagnosed based on:  · Your symptoms and medical history. Your health care provider may ask about:  ? Your sleep habits.  ? Any medical conditions you have.  ? Your mental health.  · A physical exam.  How is this treated?  Treatment for insomnia depends on the cause. Treatment may focus on treating an underlying condition that is causing insomnia. Treatment may also include:  · Medicines to help you sleep.  · Counseling or therapy.  · Lifestyle adjustments to help you sleep better.  Follow these instructions at home:  Eating and drinking    · Limit or avoid alcohol, caffeinated beverages, and cigarettes, especially close to bedtime. These can disrupt your sleep.  · Do not eat a large meal or eat spicy foods right before bedtime. This can lead to digestive discomfort that can make it hard for you to sleep.  Sleep habits    · Keep a sleep diary to help you and your health care provider figure out what could be causing your insomnia. Write down:  ? When you sleep.  ? When you wake up during the night.  ? How well you sleep.  ? How rested you feel the next day.  ? Any side effects of medicines you are taking.  ? What you eat and drink.  · Make your bedroom a dark, comfortable place where  it is easy to fall asleep.  ? Put up shades or blackout curtains to block light from outside.  ? Use a white noise machine to block noise.  ? Keep the temperature cool.  · Limit screen use before bedtime. This includes:  ? Watching TV.  ? Using your smartphone, tablet, or computer.  · Stick to a routine that includes going to bed and waking up at the same times every day and night. This can help you fall asleep faster. Consider making a quiet activity, such as reading, part of your nighttime routine.  · Try to avoid taking naps during the day so that you sleep better at night.  · Get out of bed if you are still awake after 15 minutes of trying to sleep. Keep the lights down, but try reading or doing a quiet activity. When you feel sleepy, go back to bed.  General instructions  · Take over-the-counter and prescription medicines only as told by your health care provider.  · Exercise regularly, as told by your health care provider. Avoid exercise starting several hours before bedtime.  · Use relaxation techniques to manage stress. Ask your health care provider to suggest some techniques that may work well for you. These may include:  ? Breathing exercises.  ? Routines to release muscle tension.  ? Visualizing peaceful scenes.  · Make sure that you drive carefully. Avoid driving if you feel very sleepy.  · Keep all follow-up visits as told by your health care provider. This is important.  Contact a health care provider if:  · You are tired throughout the day.  · You have trouble in your daily routine due to sleepiness.  · You continue to have sleep problems, or your sleep problems get worse.  Get help right away if:  · You have serious thoughts about hurting yourself or someone else.  If you ever feel like you may hurt yourself or others, or have thoughts about taking your own life, get help right away. You can go to your nearest emergency department or call:  · Your local emergency services (911 in the [place]  · A  suicide crisis helpline, such as the National Suicide Prevention Lifeline at 1-100.492.5597. This is open 24 hours a day.  Summary  · Insomnia is a sleep disorder that makes it difficult to fall asleep or stay asleep.  · Insomnia can be long-term (chronic) or short-term (acute).  · Treatment for insomnia depends on the cause. Treatment may focus on treating an underlying condition that is causing insomnia.  · Keep a sleep diary to help you and your health care provider figure out what could be causing your insomnia.  This information is not intended to replace advice given to you by your health care provider. Make sure you discuss any questions you have with your health care provider.  Document Released: 12/15/2001 Document Revised: 09/27/2018 Document Reviewed: 09/27/2018  Elsevier Interactive Patient Education © 2020 [employer]

## 2020-04-09 NOTE — PATIENT INSTRUCTIONS
I will call you with your lab results.    Call with any question or concerns.    Return in about 6 months (around 10/9/2020) for Annual, Labs.    Insomnia  Insomnia is a sleep disorder that makes it difficult to fall asleep or stay asleep. Insomnia can cause fatigue, low energy, difficulty concentrating, mood swings, and poor performance at work or school.  There are three different ways to classify insomnia:  · Difficulty falling asleep.  · Difficulty staying asleep.  · Waking up too early in the morning.  Any type of insomnia can be long-term (chronic) or short-term (acute). Both are common. Short-term insomnia usually lasts for three months or less. Chronic insomnia occurs at least three times a week for longer than three months.  What are the causes?  Insomnia may be caused by another condition, situation, or substance, such as:  · Anxiety.  · Certain medicines.  · Gastroesophageal reflux disease (GERD) or other gastrointestinal conditions.  · Asthma or other breathing conditions.  · Restless legs syndrome, sleep apnea, or other sleep disorders.  · Chronic pain.  · Menopause.  · Stroke.  · Abuse of alcohol, tobacco, or illegal drugs.  · Mental health conditions, such as depression.  · Caffeine.  · Neurological disorders, such as Alzheimer's disease.  · An overactive thyroid (hyperthyroidism).  Sometimes, the cause of insomnia may not be known.  What increases the risk?  Risk factors for insomnia include:  · Gender. Women are affected more often than men.  · Age. Insomnia is more common as you get older.  · Stress.  · Lack of exercise.  · Irregular work schedule or working night shifts.  · Traveling between different time zones.  · Certain medical and mental health conditions.  What are the signs or symptoms?  If you have insomnia, the main symptom is having trouble falling asleep or having trouble staying asleep. This may lead to other symptoms, such as:  · Feeling fatigued or having low energy.  · Feeling  nervous about going to sleep.  · Not feeling rested in the morning.  · Having trouble concentrating.  · Feeling irritable, anxious, or depressed.  How is this diagnosed?  This condition may be diagnosed based on:  · Your symptoms and medical history. Your health care provider may ask about:  ? Your sleep habits.  ? Any medical conditions you have.  ? Your mental health.  · A physical exam.  How is this treated?  Treatment for insomnia depends on the cause. Treatment may focus on treating an underlying condition that is causing insomnia. Treatment may also include:  · Medicines to help you sleep.  · Counseling or therapy.  · Lifestyle adjustments to help you sleep better.  Follow these instructions at home:  Eating and drinking    · Limit or avoid alcohol, caffeinated beverages, and cigarettes, especially close to bedtime. These can disrupt your sleep.  · Do not eat a large meal or eat spicy foods right before bedtime. This can lead to digestive discomfort that can make it hard for you to sleep.  Sleep habits    · Keep a sleep diary to help you and your health care provider figure out what could be causing your insomnia. Write down:  ? When you sleep.  ? When you wake up during the night.  ? How well you sleep.  ? How rested you feel the next day.  ? Any side effects of medicines you are taking.  ? What you eat and drink.  · Make your bedroom a dark, comfortable place where it is easy to fall asleep.  ? Put up shades or blackout curtains to block light from outside.  ? Use a white noise machine to block noise.  ? Keep the temperature cool.  · Limit screen use before bedtime. This includes:  ? Watching TV.  ? Using your smartphone, tablet, or computer.  · Stick to a routine that includes going to bed and waking up at the same times every day and night. This can help you fall asleep faster. Consider making a quiet activity, such as reading, part of your nighttime routine.  · Try to avoid taking naps during the day so  that you sleep better at night.  · Get out of bed if you are still awake after 15 minutes of trying to sleep. Keep the lights down, but try reading or doing a quiet activity. When you feel sleepy, go back to bed.  General instructions  · Take over-the-counter and prescription medicines only as told by your health care provider.  · Exercise regularly, as told by your health care provider. Avoid exercise starting several hours before bedtime.  · Use relaxation techniques to manage stress. Ask your health care provider to suggest some techniques that may work well for you. These may include:  ? Breathing exercises.  ? Routines to release muscle tension.  ? Visualizing peaceful scenes.  · Make sure that you drive carefully. Avoid driving if you feel very sleepy.  · Keep all follow-up visits as told by your health care provider. This is important.  Contact a health care provider if:  · You are tired throughout the day.  · You have trouble in your daily routine due to sleepiness.  · You continue to have sleep problems, or your sleep problems get worse.  Get help right away if:  · You have serious thoughts about hurting yourself or someone else.  If you ever feel like you may hurt yourself or others, or have thoughts about taking your own life, get help right away. You can go to your nearest emergency department or call:  · Your local emergency services (911 in the [place]  · A suicide crisis helpline, such as the National Suicide Prevention Lifeline at 1-141.995.5422. This is open 24 hours a day.  Summary  · Insomnia is a sleep disorder that makes it difficult to fall asleep or stay asleep.  · Insomnia can be long-term (chronic) or short-term (acute).  · Treatment for insomnia depends on the cause. Treatment may focus on treating an underlying condition that is causing insomnia.  · Keep a sleep diary to help you and your health care provider figure out what could be causing your insomnia.  This information is not intended  to replace advice given to you by your health care provider. Make sure you discuss any questions you have with your health care provider.  Document Released: 12/15/2001 Document Revised: 09/27/2018 Document Reviewed: 09/27/2018  Elsevier Interactive Patient Education © 2020 [employer]

## 2020-04-16 ENCOUNTER — RESULTS ENCOUNTER (OUTPATIENT)
Dept: FAMILY MEDICINE CLINIC | Facility: CLINIC | Age: 55
End: 2020-04-16

## 2020-04-16 DIAGNOSIS — E03.9 HYPOTHYROIDISM, UNSPECIFIED TYPE: ICD-10-CM

## 2020-05-30 LAB
ALBUMIN SERPL-MCNC: 4.5 G/DL (ref 3.8–4.9)
ALBUMIN/GLOB SERPL: 1.6 {RATIO} (ref 1.2–2.2)
ALP SERPL-CCNC: 54 IU/L (ref 39–117)
ALT SERPL-CCNC: 25 IU/L (ref 0–32)
AST SERPL-CCNC: 23 IU/L (ref 0–40)
BILIRUB SERPL-MCNC: 0.5 MG/DL (ref 0–1.2)
BUN SERPL-MCNC: 20 MG/DL (ref 6–24)
BUN/CREAT SERPL: 18 (ref 9–23)
CALCIUM SERPL-MCNC: 9.6 MG/DL (ref 8.7–10.2)
CHLORIDE SERPL-SCNC: 104 MMOL/L (ref 96–106)
CO2 SERPL-SCNC: 23 MMOL/L (ref 20–29)
CREAT SERPL-MCNC: 1.1 MG/DL (ref 0.57–1)
GLOBULIN SER CALC-MCNC: 2.8 G/DL (ref 1.5–4.5)
GLUCOSE SERPL-MCNC: 93 MG/DL (ref 65–99)
POTASSIUM SERPL-SCNC: 5.1 MMOL/L (ref 3.5–5.2)
PROT SERPL-MCNC: 7.3 G/DL (ref 6–8.5)
SODIUM SERPL-SCNC: 143 MMOL/L (ref 134–144)
TSH SERPL DL<=0.005 MIU/L-ACNC: 6.92 UIU/ML (ref 0.45–4.5)

## 2020-08-05 DIAGNOSIS — F51.04 PSYCHOPHYSIOLOGICAL INSOMNIA: ICD-10-CM

## 2020-08-05 RX ORDER — TRAZODONE HYDROCHLORIDE 50 MG/1
50 TABLET ORAL EVERY EVENING
Qty: 90 TABLET | Refills: 1 | Status: SHIPPED | OUTPATIENT
Start: 2020-08-05 | End: 2021-01-27

## 2020-08-19 DIAGNOSIS — E03.9 ACQUIRED HYPOTHYROIDISM: ICD-10-CM

## 2020-08-19 RX ORDER — LEVOTHYROXINE SODIUM 88 UG/1
TABLET ORAL
Qty: 15 TABLET | Refills: 0 | Status: SHIPPED | OUTPATIENT
Start: 2020-08-19 | End: 2021-01-07 | Stop reason: DRUGHIGH

## 2020-11-17 ENCOUNTER — TELEPHONE (OUTPATIENT)
Dept: FAMILY MEDICINE CLINIC | Facility: CLINIC | Age: 55
End: 2020-11-17

## 2020-11-25 ENCOUNTER — TELEPHONE (OUTPATIENT)
Dept: FAMILY MEDICINE CLINIC | Facility: CLINIC | Age: 55
End: 2020-11-25

## 2020-11-25 NOTE — TELEPHONE ENCOUNTER
PATIENT STATES THAT SHE WAS TOLD TO MONITOR HER BLOOD PRESSURE  AND SHE CALLING TO REPORT HER READINGS.    PATIENT STATES THAT TODAY HER BLOOD PRESSURE /96.  SHE STATES THAT PRIOR TO THAT THE TOP NUMBER OF HER READINGS WERE NORMAL HOWEVER THE BOTTOM NUMBER HAS BEEN RUNNING BETWEEN 95-99.    PLEASE ADVISE    PATIENT CAN BE REACHED AT  [phone]

## 2021-01-06 DIAGNOSIS — E78.5 HYPERLIPIDEMIA, UNSPECIFIED HYPERLIPIDEMIA TYPE: ICD-10-CM

## 2021-01-06 DIAGNOSIS — I10 ESSENTIAL HYPERTENSION: ICD-10-CM

## 2021-01-06 DIAGNOSIS — E03.9 ACQUIRED HYPOTHYROIDISM: Primary | ICD-10-CM

## 2021-01-07 DIAGNOSIS — E03.9 HYPOTHYROIDISM, UNSPECIFIED TYPE: Primary | ICD-10-CM

## 2021-01-07 DIAGNOSIS — E03.9 HYPOTHYROIDISM, UNSPECIFIED TYPE: ICD-10-CM

## 2021-01-07 DIAGNOSIS — I10 ESSENTIAL HYPERTENSION: ICD-10-CM

## 2021-01-07 DIAGNOSIS — I10 ESSENTIAL HYPERTENSION: Primary | ICD-10-CM

## 2021-01-07 DIAGNOSIS — E78.5 HYPERLIPIDEMIA, UNSPECIFIED HYPERLIPIDEMIA TYPE: ICD-10-CM

## 2021-01-07 LAB
ALBUMIN SERPL-MCNC: 4.4 G/DL (ref 3.8–4.9)
ALBUMIN/GLOB SERPL: 1.7 {RATIO} (ref 1.2–2.2)
ALP SERPL-CCNC: 53 IU/L (ref 39–117)
ALT SERPL-CCNC: 31 IU/L (ref 0–32)
AST SERPL-CCNC: 25 IU/L (ref 0–40)
BILIRUB SERPL-MCNC: 0.7 MG/DL (ref 0–1.2)
BUN SERPL-MCNC: 22 MG/DL (ref 6–24)
BUN/CREAT SERPL: 20 (ref 9–23)
CALCIUM SERPL-MCNC: 9.8 MG/DL (ref 8.7–10.2)
CHLORIDE SERPL-SCNC: 104 MMOL/L (ref 96–106)
CHOLEST SERPL-MCNC: 220 MG/DL (ref 100–199)
CHOLEST/HDLC SERPL: 4.8 RATIO (ref 0–4.4)
CO2 SERPL-SCNC: 22 MMOL/L (ref 20–29)
CREAT SERPL-MCNC: 1.09 MG/DL (ref 0.57–1)
GLOBULIN SER CALC-MCNC: 2.6 G/DL (ref 1.5–4.5)
GLUCOSE SERPL-MCNC: 97 MG/DL (ref 65–99)
HDLC SERPL-MCNC: 46 MG/DL
LDLC SERPL CALC-MCNC: 152 MG/DL (ref 0–99)
POTASSIUM SERPL-SCNC: 5.3 MMOL/L (ref 3.5–5.2)
PROT SERPL-MCNC: 7 G/DL (ref 6–8.5)
SODIUM SERPL-SCNC: 139 MMOL/L (ref 134–144)
TRIGL SERPL-MCNC: 124 MG/DL (ref 0–149)
TSH SERPL DL<=0.005 MIU/L-ACNC: 13 UIU/ML (ref 0.45–4.5)
VLDLC SERPL CALC-MCNC: 22 MG/DL (ref 5–40)

## 2021-01-07 RX ORDER — LISINOPRIL 10 MG/1
10 TABLET ORAL DAILY
Qty: 30 TABLET | Refills: 3 | Status: SHIPPED | OUTPATIENT
Start: 2021-01-07 | End: 2021-01-07 | Stop reason: SDUPTHER

## 2021-01-07 RX ORDER — LEVOTHYROXINE SODIUM 0.1 MG/1
100 TABLET ORAL DAILY
Qty: 30 TABLET | Refills: 3 | Status: SHIPPED | OUTPATIENT
Start: 2021-01-07 | End: 2021-01-07

## 2021-01-07 RX ORDER — LISINOPRIL 10 MG/1
10 TABLET ORAL DAILY
Qty: 30 TABLET | Refills: 3 | Status: SHIPPED | OUTPATIENT
Start: 2021-01-07 | End: 2021-02-16 | Stop reason: SINTOL

## 2021-01-07 RX ORDER — ROSUVASTATIN CALCIUM 10 MG/1
10 TABLET, COATED ORAL NIGHTLY
Qty: 30 TABLET | Refills: 3 | Status: SHIPPED | OUTPATIENT
Start: 2021-01-07 | End: 2021-01-07 | Stop reason: SDUPTHER

## 2021-01-07 RX ORDER — ROSUVASTATIN CALCIUM 10 MG/1
10 TABLET, COATED ORAL NIGHTLY
Qty: 30 TABLET | Refills: 3 | Status: SHIPPED | OUTPATIENT
Start: 2021-01-07 | End: 2021-03-29

## 2021-01-07 RX ORDER — LEVOTHYROXINE SODIUM 0.1 MG/1
100 TABLET ORAL DAILY
Qty: 30 TABLET | Refills: 3 | Status: SHIPPED | OUTPATIENT
Start: 2021-01-07 | End: 2021-02-17 | Stop reason: DRUGHIGH

## 2021-01-07 NOTE — TELEPHONE ENCOUNTER
Caller: Mejia Teaguen    Relationship: Self    Best call back number: [phone]     Medication needed:   Requested Prescriptions     Pending Prescriptions Disp Refills   • levothyroxine (Synthroid) 100 MCG tablet 30 tablet 3     Sig: Take 1 tablet by mouth Daily.   • lisinopril (PRINIVIL,ZESTRIL) 10 MG tablet 30 tablet 3     Sig: Take 1 tablet by mouth Daily.   • rosuvastatin (Crestor) 10 MG tablet 30 tablet 3     Sig: Take 1 tablet by mouth Every Night.       When do you need the refill by: ASAP    What details did the patient provide when requesting the medication: PT SAID THAT REFILLS OF LEVOTHYROXINE, LISNOPRIL AND ROSUVASTATIN WERE SENT TO THE WRONG PHARMACY.    PT ASKS FOR REFILLS TO BE SENT TO [facility] [address] [place] [address], [place] [address].    Does the patient have less than a 3 day supply:  [x] Yes  [] No    What is the patient's preferred pharmacy: [facility] #[id number] - [place], [place] - [address] [place] [address] - [phone]  - [phone] FX

## 2021-01-27 DIAGNOSIS — E03.9 ACQUIRED HYPOTHYROIDISM: ICD-10-CM

## 2021-01-27 DIAGNOSIS — F51.04 PSYCHOPHYSIOLOGICAL INSOMNIA: ICD-10-CM

## 2021-01-28 RX ORDER — TRAZODONE HYDROCHLORIDE 50 MG/1
50 TABLET ORAL EVERY EVENING
Qty: 90 TABLET | Refills: 0 | Status: SHIPPED | OUTPATIENT
Start: 2021-01-28 | End: 2021-02-16 | Stop reason: SDUPTHER

## 2021-01-28 RX ORDER — LEVOTHYROXINE SODIUM 88 UG/1
88 TABLET ORAL DAILY
Qty: 90 TABLET | Refills: 0 | Status: SHIPPED | OUTPATIENT
Start: 2021-01-28 | End: 2021-02-16

## 2021-02-16 ENCOUNTER — OFFICE VISIT (OUTPATIENT)
Dept: FAMILY MEDICINE CLINIC | Facility: CLINIC | Age: 56
End: 2021-02-16

## 2021-02-16 VITALS
HEART RATE: 60 BPM | WEIGHT: 196 LBS | BODY MASS INDEX: 32.65 KG/M2 | TEMPERATURE: 97.2 F | HEIGHT: 65 IN | RESPIRATION RATE: 16 BRPM | SYSTOLIC BLOOD PRESSURE: 132 MMHG | DIASTOLIC BLOOD PRESSURE: 72 MMHG | OXYGEN SATURATION: 98 %

## 2021-02-16 DIAGNOSIS — F51.04 PSYCHOPHYSIOLOGICAL INSOMNIA: ICD-10-CM

## 2021-02-16 DIAGNOSIS — I10 ESSENTIAL HYPERTENSION: ICD-10-CM

## 2021-02-16 DIAGNOSIS — Z12.31 ENCOUNTER FOR SCREENING MAMMOGRAM FOR MALIGNANT NEOPLASM OF BREAST: Primary | ICD-10-CM

## 2021-02-16 DIAGNOSIS — E03.9 HYPOTHYROIDISM, UNSPECIFIED TYPE: ICD-10-CM

## 2021-02-16 PROCEDURE — 99213 OFFICE O/P EST LOW 20 MIN: CPT | Performed by: NURSE PRACTITIONER

## 2021-02-16 PROCEDURE — 99396 PREV VISIT EST AGE 40-64: CPT | Performed by: NURSE PRACTITIONER

## 2021-02-16 RX ORDER — OLMESARTAN MEDOXOMIL 20 MG/1
20 TABLET ORAL DAILY
Qty: 90 TABLET | Refills: 1 | Status: SHIPPED | OUTPATIENT
Start: 2021-02-16 | End: 2021-08-13

## 2021-02-16 RX ORDER — TRAZODONE HYDROCHLORIDE 50 MG/1
50 TABLET ORAL EVERY EVENING
Qty: 90 TABLET | Refills: 1 | Status: SHIPPED | OUTPATIENT
Start: 2021-02-16 | End: 2021-04-18 | Stop reason: SDUPTHER

## 2021-02-16 NOTE — PROGRESS NOTES
"Subjective   Sierra Teague is a 55 y.o. female.     History of Present Illness   Patient presents for routine health maintenance. She reports that she is doing well and states that she has no concerns.     Patient presents for follow-up for hypothyroidism, chronic, ongoing. Patient's  last TSH was 13. Her levothyroxine 100 mcg 1 po QD. She reports that she is feeling much better with this dose. She reports that her 'eyes' feel better and her fatigue has improved.    Patient is also being seen for hypertension, chronic, ongoing. She was started on lisinopril, but reports that she has had a \"dry cough\" with the medication.  Her blood pressure is controlled at today's visit. Patient denies any chest pain, shortness of breath, dizziness or palpitations.     The following portions of the patient's history were reviewed and updated as appropriate: allergies, current medications, past family history, past medical history, past social history, past surgical history and problem list.    Review of Systems   Constitutional: Negative for appetite change, chills, fatigue, fever, unexpected weight gain and unexpected weight loss.   HENT: Negative for congestion, dental problem, nosebleeds, postnasal drip, sinus pressure, sore throat and tinnitus.         Dental exam is up to date.    Eyes: Negative.  Negative for blurred vision, double vision and photophobia.        Wears glasses. Eye exam is up to date.   Respiratory: Negative for cough, chest tightness, shortness of breath and wheezing.    Cardiovascular: Negative for chest pain, palpitations and leg swelling.   Gastrointestinal: Negative for abdominal pain, constipation, diarrhea, nausea, vomiting and GERD.   Endocrine: Negative.  Negative for cold intolerance and heat intolerance.   Genitourinary: Negative.  Negative for breast discharge, breast lump, breast pain, dysuria, frequency, pelvic pain, pelvic pressure, vaginal bleeding and vaginal discharge.   Musculoskeletal: " Negative for arthralgias, back pain, joint swelling and myalgias.   Skin: Negative.    Allergic/Immunologic: Negative.  Negative for environmental allergies and food allergies.   Neurological: Negative for dizziness, weakness, numbness and headache.   Hematological: Negative.  Does not bruise/bleed easily.   Psychiatric/Behavioral: Negative.  Negative for sleep disturbance and depressed mood. The patient is not nervous/anxious.        Objective   Physical Exam  Vitals signs and nursing note reviewed.   Constitutional:       Appearance: Normal appearance. She is well-developed and normal weight.   HENT:      Head: Normocephalic and atraumatic.      Right Ear: Tympanic membrane, ear canal and external ear normal.      Left Ear: Tympanic membrane, ear canal and external ear normal.      Nose: Nose normal.      Mouth/Throat:      Lips: Pink.      Mouth: Mucous membranes are moist.      Tongue: No lesions.      Palate: No mass and lesions.      Pharynx: Oropharynx is clear. Uvula midline.      Tonsils: No tonsillar exudate.   Eyes:      Conjunctiva/sclera: Conjunctivae normal.      Pupils: Pupils are equal, round, and reactive to light.   Neck:      Musculoskeletal: Normal range of motion and neck supple.      Thyroid: No thyromegaly.   Cardiovascular:      Rate and Rhythm: Normal rate and regular rhythm.      Pulses: Normal pulses.           Dorsalis pedis pulses are 2+ on the right side and 2+ on the left side.        Posterior tibial pulses are 2+ on the right side and 2+ on the left side.      Heart sounds: Normal heart sounds. No murmur.   Pulmonary:      Effort: Pulmonary effort is normal.      Breath sounds: Normal breath sounds.   Abdominal:      General: Bowel sounds are normal. There is no distension.      Palpations: Abdomen is soft.      Tenderness: There is no abdominal tenderness.   Musculoskeletal: Normal range of motion.         General: No deformity.      Right lower leg: No edema.      Left lower leg:  No edema.   Lymphadenopathy:      Head:      Right side of head: No submental, submandibular, tonsillar, preauricular, posterior auricular or occipital adenopathy.      Left side of head: No submental, submandibular, tonsillar, preauricular, posterior auricular or occipital adenopathy.      Cervical: No cervical adenopathy.      Right cervical: No superficial, deep or posterior cervical adenopathy.     Left cervical: No superficial, deep or posterior cervical adenopathy.      Upper Body:      Right upper body: No supraclavicular adenopathy.      Left upper body: No supraclavicular adenopathy.   Skin:     General: Skin is warm and dry.      Capillary Refill: Capillary refill takes 2 to 3 seconds.   Neurological:      General: No focal deficit present.      Mental Status: She is alert and oriented to person, place, and time.      Cranial Nerves: Cranial nerves are intact. No cranial nerve deficit.      Sensory: Sensation is intact.      Motor: Motor function is intact.      Coordination: Coordination is intact.      Gait: Gait is intact.   Psychiatric:         Attention and Perception: Attention and perception normal.         Mood and Affect: Mood and affect normal.         Speech: Speech normal.         Behavior: Behavior normal. Behavior is cooperative.         Thought Content: Thought content normal.         Cognition and Memory: Cognition and memory normal.         Judgment: Judgment normal.         Vitals:    02/16/21 1447   BP: 132/72   Pulse: 60   Resp: 16   Temp: 97.2 °F (36.2 °C)   SpO2: 98%     Body mass index is 32.62 kg/m².    Procedures    Assessment/Plan   Problems Addressed this Visit        Cardiac and Vasculature    Essential hypertension    Relevant Medications    olmesartan (BENICAR) 20 MG tablet       Endocrine and Metabolic    Hypothyroidism       Sleep    Insomnia    Relevant Medications    traZODone (DESYREL) 50 MG tablet      Other Visit Diagnoses     Encounter for screening mammogram for  malignant neoplasm of breast    -  Primary    Relevant Orders    Mammo Screening Digital Tomosynthesis Bilateral With CAD      Diagnoses       Codes Comments    Encounter for screening mammogram for malignant neoplasm of breast    -  Primary ICD-10-CM: Z12.31  ICD-9-CM: V76.12     Essential hypertension     ICD-10-CM: I10  ICD-9-CM: 401.9     Hypothyroidism, unspecified type     ICD-10-CM: E03.9  ICD-9-CM: 244.9     Psychophysiological insomnia     ICD-10-CM: F51.04  ICD-9-CM: 307.42         Reviewed recent labs with patient.  TSH  TPO  TG antibody  CMP  Discontinue lisinopril due to cough.  Will start olmesartan 20mg 1p.o. QD.  Mammogram  Return in about 6 months (around 2021) for Recheck, Labs.  Recommended low sodium diet.          Answers for HPI/ROS submitted by the patient on 2021   What is the primary reason for your visit?: High Blood Pressure    Patient Instructions     Barbara Pool - dermatologist  Return in about 6 months (around 2021) for Recheck, Labs.  I will call you with your lab results.   Please call with any questions or concerns.       Annual Wellness  Personal Prevention Plan of Service     Date of Office Visit:  2021  Encounter Provider:  MINGO Chaidez  Place of Service:  [facility]IMARY [facility]  Patient Name: Sierra Teague  :  1965    As part of the Annual Wellness portion of your visit today, we are providing you with this personalized preventive plan of services (PPPS). This plan is based upon recommendations of the [place] Preventive Services Task Force (USPSTF) and the Advisory Committee on Immunization Practices (ACIP).    This lists the preventive care services that should be considered, and provides dates of when you are due. Items listed as completed are up-to-date and do not require any further intervention.    Health Maintenance   Topic Date Due   • ZOSTER VACCINE (1 of 2) 2013   • MAMMOGRAM  2019   • PAP SMEAR   07/12/2021   • COLONOSCOPY  11/17/2021   • LIPID PANEL  01/06/2022   • ANNUAL PHYSICAL  02/17/2022   • TDAP/TD VACCINES (2 - Td) 04/26/2028   • HEPATITIS C SCREENING  Completed   • INFLUENZA VACCINE  Completed   • Pneumococcal Vaccine 0-64  Aged Out   • MENINGOCOCCAL VACCINE  Aged Out       Orders Placed This Encounter   Procedures   • Mammo Screening Digital Tomosynthesis Bilateral With CAD     Standing Status:   Future     Standing Expiration Date:   2/16/2022     Order Specific Question:   Reason for Exam:     Answer:   Breast cancer screening.       Return in about 6 months (around 8/16/2021) for Recheck, Labs.

## 2021-02-17 DIAGNOSIS — E03.9 HYPOTHYROIDISM, UNSPECIFIED TYPE: Primary | ICD-10-CM

## 2021-02-17 DIAGNOSIS — R94.6 ABNORMAL THYROID FUNCTION TEST: ICD-10-CM

## 2021-02-17 LAB
ALBUMIN SERPL-MCNC: 4.4 G/DL (ref 3.8–4.9)
ALBUMIN/GLOB SERPL: 1.6 {RATIO} (ref 1.2–2.2)
ALP SERPL-CCNC: 58 IU/L (ref 39–117)
ALT SERPL-CCNC: 61 IU/L (ref 0–32)
AST SERPL-CCNC: 41 IU/L (ref 0–40)
BILIRUB SERPL-MCNC: 0.6 MG/DL (ref 0–1.2)
BUN SERPL-MCNC: 26 MG/DL (ref 6–24)
BUN/CREAT SERPL: 26 (ref 9–23)
CALCIUM SERPL-MCNC: 9.9 MG/DL (ref 8.7–10.2)
CHLORIDE SERPL-SCNC: 105 MMOL/L (ref 96–106)
CO2 SERPL-SCNC: 25 MMOL/L (ref 20–29)
CREAT SERPL-MCNC: 1.01 MG/DL (ref 0.57–1)
GLOBULIN SER CALC-MCNC: 2.7 G/DL (ref 1.5–4.5)
GLUCOSE SERPL-MCNC: 87 MG/DL (ref 65–99)
POTASSIUM SERPL-SCNC: 5.4 MMOL/L (ref 3.5–5.2)
PROT SERPL-MCNC: 7.1 G/DL (ref 6–8.5)
SODIUM SERPL-SCNC: 141 MMOL/L (ref 134–144)
THYROGLOB AB SERPL-ACNC: 166.5 IU/ML (ref 0–0.9)
THYROPEROXIDASE AB SERPL-ACNC: 196 IU/ML (ref 0–34)
TSH SERPL DL<=0.005 MIU/L-ACNC: 0.3 UIU/ML (ref 0.45–4.5)

## 2021-02-17 RX ORDER — LEVOTHYROXINE SODIUM 88 UG/1
88 TABLET ORAL DAILY
Qty: 30 TABLET | Refills: 3 | Status: SHIPPED | OUTPATIENT
Start: 2021-02-17 | End: 2021-06-15

## 2021-02-23 ENCOUNTER — OFFICE VISIT (OUTPATIENT)
Dept: CARDIOLOGY | Facility: CLINIC | Age: 56
End: 2021-02-23

## 2021-02-23 VITALS
HEART RATE: 66 BPM | SYSTOLIC BLOOD PRESSURE: 132 MMHG | DIASTOLIC BLOOD PRESSURE: 70 MMHG | HEIGHT: 65 IN | WEIGHT: 198.6 LBS | BODY MASS INDEX: 33.09 KG/M2

## 2021-02-23 DIAGNOSIS — I10 ESSENTIAL HYPERTENSION: Primary | ICD-10-CM

## 2021-02-23 DIAGNOSIS — E78.2 MIXED HYPERLIPIDEMIA: ICD-10-CM

## 2021-02-23 PROCEDURE — 99214 OFFICE O/P EST MOD 30 MIN: CPT | Performed by: INTERNAL MEDICINE

## 2021-02-23 PROCEDURE — 93000 ELECTROCARDIOGRAM COMPLETE: CPT | Performed by: INTERNAL MEDICINE

## 2021-02-23 NOTE — PROGRESS NOTES
Date of Office Visit: 2021  Encounter Provider: Erum Fernandez MD  Place of Service: [facility] CARDIOLOGY  Patient Name: Sierra Teague  :1965      Patient ID:  Sierra Teague is a 55 y.o. female is here for  followup for cardiac risks.         History of Present Illness    She had a stress echocardiogram done  in  for chest pain. This was negative and her ejection fraction was 55-60%. At that  time, all of her valves appeared normal.      She is treated for hypothyroidism.   In her family, her mother had triple bypass at 63. She had a brother with quadruple  bypass at 57, another brother with myocardial infarction at 57, and her father had a  myocardial infarction at 65. Her mother has also had transient ischemic attacks and  hypertension run in the family.      She had vascular screening done in 2014 which was normal. She had a calcium score done  on 2014 which gave her a score of 95 and all that calcium really is in the left  anterior descending artery.  She had a nonischemic stress nuclear perfusion study done 2018.    Labs on 2021 show potassium 5.4, otherwise unremarkable CMP, thyroglobulin antibody elevated at 166, thyroid peroxidase elevated at 196, TSH normal.  Lipids done 2021 showed total cholesterol 220, HDL 46, , triglycerides 124.    In 2020, she started feeling poorly.  She noticed headaches and poor energy.  She started losing her hair and had changes in her skin being very dry.  In addition her blood pressure was high on the diastolic side and she was having intermittent headaches.  She saw her primary care physician and medication changes were made including the addition of olmesartan.  Her blood pressure responded well to this.  She then had laboratory values done looking at her thyroid which was off.  Continued investigation has revealed what appears to be Hashimoto's thyroiditis and she is now scheduled for  thyroid ultrasound.  She has been able to exercise through all this, walking 30 minutes 4 to 5 days weekly and has no chest pain or pressure associated with this.  She has no orthopnea or PND.    Past Medical History:   Diagnosis Date   • Atypical nevi     followed by Dermatology, Dr. Greenberg   • Colitis    • Conjunctivitis    • Degenerative joint disease (DJD) of lumbar spine    • Dysphagia    • Family history of heart disease     follows w/Cardiology, Dr. Fernandez annually   • H/O echocardiogram 07/12/2012   • Hyperlipidemia    • Hypothyroidism    • Insomnia    • Plantar fasciitis     followed by Podiatry         Past Surgical History:   Procedure Laterality Date   • BACK SURGERY  2005    discectomy, lumbar   • BREAST BIOPSY     • COLONOSCOPY  11/22/2013       Current Outpatient Medications on File Prior to Visit   Medication Sig Dispense Refill   • levothyroxine (Synthroid) 88 MCG tablet Take 1 tablet by mouth Daily. 30 tablet 3   • olmesartan (BENICAR) 20 MG tablet Take 1 tablet by mouth Daily. 90 tablet 1   • rosuvastatin (Crestor) 10 MG tablet Take 1 tablet by mouth Every Night. 30 tablet 3   • traZODone (DESYREL) 50 MG tablet Take 1 tablet by mouth Every Evening. 90 tablet 1   • [DISCONTINUED] aspirin 81 MG EC tablet Take 81 mg by mouth Daily.       No current facility-administered medications on file prior to visit.        Social History     Socioeconomic History   • Marital status:      Spouse name: Not on file   • Number of children: Not on file   • Years of education: Not on file   • Highest education level: Not on file   Tobacco Use   • Smoking status: Never Smoker   • Smokeless tobacco: Never Used   • Tobacco comment: Caffeine use:1 cup daily   Substance and Sexual Activity   • Alcohol use: Yes     Comment: Ocassionally    • Drug use: No   • Sexual activity: Yes     Partners: Female   Social History Narrative     at [employer].  Lives at home with her wife and their adopted  "child           ROS    Procedures    ECG 12 Lead    Date/Time: 2/23/2021 9:03 AM  Performed by: Erum Fernandez MD  Authorized by: Erum Fernandez MD   Comparison: compared with previous ECG   Similar to previous ECG  Rhythm: sinus rhythm    Clinical impression: normal ECG                Objective:      Vitals:    02/23/21 0845   BP: 132/70   BP Location: Left arm   Pulse: 66   Weight: 90.1 kg (198 lb 9.6 oz)   Height: 165.1 cm (65\")     Body mass index is 33.05 kg/m².    Vitals signs reviewed.   Constitutional:       General: Not in acute distress.     Appearance: Well-developed. Not diaphoretic.   Eyes:      General: No scleral icterus.     Conjunctiva/sclera: Conjunctivae normal.   HENT:      Head: Normocephalic and atraumatic.   Neck:      Musculoskeletal: Neck supple.      Thyroid: No thyromegaly.      Vascular: No carotid bruit or JVD.      Lymphadenopathy: No cervical adenopathy.   Pulmonary:      Effort: Pulmonary effort is normal. No respiratory distress.      Breath sounds: Normal breath sounds. No wheezing. No rhonchi. No rales.   Chest:      Chest wall: Not tender to palpatation.   Cardiovascular:      Normal rate. Regular rhythm.      Murmurs: There is no murmur.      No gallop.   Pulses:     Intact distal pulses.   Edema:     Peripheral edema absent.   Abdominal:      General: Bowel sounds are normal. There is no distension or abdominal bruit.      Palpations: Abdomen is soft. There is no abdominal mass.      Tenderness: There is no abdominal tenderness.   Musculoskeletal:         General: No deformity.      Extremities: No clubbing present.  Skin:     General: Skin is warm and dry. There is no cyanosis.      Coloration: Skin is not pale.      Findings: No rash.   Neurological:      Mental Status: Alert and oriented to person, place, and time.      Cranial Nerves: No cranial nerve deficit.   Psychiatric:         Judgment: Judgment normal.         Lab Review:       Assessment:      Diagnosis " Plan   1. Essential hypertension     2. Mixed hyperlipidemia       1. Strong family history of cardiovascular disease.   2. Hyperlipidemia.   On rosuvastatin.  3. Hypothyroidism stable.  On Synthroid, labs suggest Hashimoto's thyroiditis.  4. Coronary calcium, 95, scan in 2014.  5. Hypertension, target less than 120/80.       Plan:       See Maria Dolores in 1 year, no medication changes, overall doing well.  May need a repeat coronary calcium score in a year.

## 2021-03-03 ENCOUNTER — HOSPITAL ENCOUNTER (OUTPATIENT)
Dept: ULTRASOUND IMAGING | Facility: HOSPITAL | Age: 56
Discharge: HOME | End: 2021-03-03
Admitting: NURSE PRACTITIONER

## 2021-03-03 DIAGNOSIS — R94.6 ABNORMAL THYROID FUNCTION TEST: ICD-10-CM

## 2021-03-03 DIAGNOSIS — E03.9 HYPOTHYROIDISM, UNSPECIFIED TYPE: ICD-10-CM

## 2021-03-03 PROCEDURE — 76536 US EXAM OF HEAD AND NECK: CPT

## 2021-03-04 DIAGNOSIS — E03.9 ACQUIRED HYPOTHYROIDISM: ICD-10-CM

## 2021-03-04 DIAGNOSIS — R76.8 THYROID ANTIBODY POSITIVE: Primary | ICD-10-CM

## 2021-03-26 ENCOUNTER — BULK ORDERING (OUTPATIENT)
Dept: CASE MANAGEMENT | Facility: OTHER | Age: 56
End: 2021-03-26

## 2021-03-26 DIAGNOSIS — Z23 IMMUNIZATION DUE: ICD-10-CM

## 2021-03-28 DIAGNOSIS — E78.5 HYPERLIPIDEMIA, UNSPECIFIED HYPERLIPIDEMIA TYPE: ICD-10-CM

## 2021-03-29 RX ORDER — ROSUVASTATIN CALCIUM 10 MG/1
TABLET, COATED ORAL
Qty: 30 TABLET | Refills: 3 | Status: SHIPPED | OUTPATIENT
Start: 2021-03-29 | End: 2021-06-28

## 2021-04-01 ENCOUNTER — HOSPITAL ENCOUNTER (OUTPATIENT)
Dept: MAMMOGRAPHY | Facility: HOSPITAL | Age: 56
Discharge: HOME | End: 2021-04-01
Admitting: NURSE PRACTITIONER

## 2021-04-01 DIAGNOSIS — Z12.31 ENCOUNTER FOR SCREENING MAMMOGRAM FOR MALIGNANT NEOPLASM OF BREAST: ICD-10-CM

## 2021-04-01 PROCEDURE — 77067 SCR MAMMO BI INCL CAD: CPT

## 2021-04-01 PROCEDURE — 77063 BREAST TOMOSYNTHESIS BI: CPT

## 2021-04-02 DIAGNOSIS — R92.8 ABNORMAL MAMMOGRAM: Primary | ICD-10-CM

## 2021-04-02 DIAGNOSIS — R92.8 ABNORMAL MAMMOGRAM OF RIGHT BREAST: Primary | ICD-10-CM

## 2021-04-05 ENCOUNTER — HOSPITAL ENCOUNTER (OUTPATIENT)
Dept: MAMMOGRAPHY | Facility: HOSPITAL | Age: 56
Discharge: HOME | End: 2021-04-05
Admitting: NURSE PRACTITIONER

## 2021-04-05 DIAGNOSIS — R92.8 ABNORMAL MAMMOGRAM: ICD-10-CM

## 2021-04-05 DIAGNOSIS — R92.8 ABNORMAL MAMMOGRAM: Primary | ICD-10-CM

## 2021-04-05 PROCEDURE — 77065 DX MAMMO INCL CAD UNI: CPT

## 2021-04-16 ENCOUNTER — HOSPITAL ENCOUNTER (OUTPATIENT)
Dept: MAMMOGRAPHY | Facility: HOSPITAL | Age: 56
Discharge: HOME | End: 2021-04-16
Admitting: NURSE PRACTITIONER

## 2021-04-16 VITALS
DIASTOLIC BLOOD PRESSURE: 83 MMHG | WEIGHT: 198 LBS | HEIGHT: 65 IN | TEMPERATURE: 98 F | OXYGEN SATURATION: 99 % | BODY MASS INDEX: 32.99 KG/M2 | RESPIRATION RATE: 16 BRPM | SYSTOLIC BLOOD PRESSURE: 121 MMHG | HEART RATE: 60 BPM

## 2021-04-16 DIAGNOSIS — R92.8 ABNORMAL MAMMOGRAM: ICD-10-CM

## 2021-04-16 PROCEDURE — 88305 TISSUE EXAM BY PATHOLOGIST: CPT | Performed by: NURSE PRACTITIONER

## 2021-04-16 PROCEDURE — 25010000003 LIDOCAINE 1 % SOLUTION: Performed by: NURSE PRACTITIONER

## 2021-04-16 RX ORDER — LIDOCAINE HYDROCHLORIDE 10 MG/ML
1 INJECTION, SOLUTION INFILTRATION; PERINEURAL ONCE
Status: COMPLETED | OUTPATIENT
Start: 2021-04-16 | End: 2021-04-16

## 2021-04-16 RX ORDER — DIAZEPAM 5 MG/1
5 TABLET ORAL ONCE AS NEEDED
Status: DISCONTINUED | OUTPATIENT
Start: 2021-04-16 | End: 2021-04-17 | Stop reason: HOSPADM

## 2021-04-16 RX ADMIN — LIDOCAINE HYDROCHLORIDE 23 ML: 10; .005 INJECTION, SOLUTION EPIDURAL; INFILTRATION; INTRACAUDAL; PERINEURAL at 08:53

## 2021-04-16 RX ADMIN — LIDOCAINE HYDROCHLORIDE 1 ML: 10 INJECTION, SOLUTION INFILTRATION; PERINEURAL at 08:53

## 2021-04-16 NOTE — H&P
Name: Sierra Teague ADMIT: 2021   : 1965  PCP: Kandis Santillan APRN    MRN: [id number] LOS: 0 days   AGE/SEX: 55 y.o. female  ROOM: Room/bed info not found       Chief complaint right breast calcifications    Present Illness or Internal History:  Patient is a 55 y.o. female presents with right breast calcifications.     Past Surgical History:  Past Surgical History:   Procedure Laterality Date   • BACK SURGERY      discectomy, lumbar   • BREAST BIOPSY Left     Benign   • BREAST CYST EXCISION Left    • COLONOSCOPY  2013       Past Medical History:  Past Medical History:   Diagnosis Date   • Atypical nevi     followed by Dermatology, Dr. Greenberg   • Colitis    • Conjunctivitis    • Degenerative joint disease (DJD) of lumbar spine    • Dysphagia    • Family history of heart disease     follows w/Cardiology, Dr. Fernandez annually   • H/O echocardiogram 2012   • Hyperlipidemia    • Hypothyroidism    • Insomnia    • Plantar fasciitis     followed by Podiatry       Home Medications:  (Not in a hospital admission)      Allergies:  Azithromycin, Penicillins, and Lisinopril    Family History:  Family History   Problem Relation Age of Onset   • Arthritis Mother         [id number]   • Diabetes Mother    • Heart disease Mother         quad bypass at age 62   • Hyperlipidemia Mother    • Hypertension Mother    • Thyroid disease Mother    • Stroke Mother 70   • Heart attack Mother    • Heart disease Father         CAD   • Hypertension Father    • Thyroid disease Father    • Hyperlipidemia Father    • Heart attack Father    • Cancer Brother 50        throat   • Thyroid disease Brother    • Colon cancer Maternal Grandmother 62   • Thyroid disease Sister    • Diabetes Sister    • Cancer Sister 58        uterine   • Throat cancer Brother        Social History:  Social History     Tobacco Use   • Smoking status: Never Smoker   • Smokeless tobacco: Never Used   • Tobacco comment: Caffeine use:1  cup daily   Substance Use Topics   • Alcohol use: Yes     Comment: Ocassionally    • Drug use: No        Objective     Physical Exam:    No exam performed today,    Vital Signs  Temp:  [98 °F (36.7 °C)] 98 °F (36.7 °C)  Heart Rate:  [71] 71  Resp:  [16] 16  BP: (123)/(80) 123/80    Anticipated Surgical Procedure:  Stereotactic guided vacuum assisted right breast biopsy with clip placement    The risks, benefits and alternatives of this procedure have been discussed with the patient or responsible party: Yes        Vaibhav Song Jr., MD  04/16/21  08:38 EDT

## 2021-04-16 NOTE — NURSING NOTE
Biopsy site to right upper mid breast clear with Dermabond dry and intact. No firmness or swelling noted at or around biopsy site. Denies pain. Ice pack with protective covering applied to biopsy site. Discharge instructions discussed with understanding voiced by patient. Copies provided to patient. No distress noted. To home via private vehicle.

## 2021-04-17 ENCOUNTER — TELEPHONE (OUTPATIENT)
Dept: RADIOLOGY | Facility: HOSPITAL | Age: 56
End: 2021-04-17

## 2021-04-18 DIAGNOSIS — F51.04 PSYCHOPHYSIOLOGICAL INSOMNIA: ICD-10-CM

## 2021-04-19 LAB
CYTO UR: NORMAL
LAB AP CASE REPORT: NORMAL
LAB AP DIAGNOSIS COMMENT: NORMAL
PATH REPORT.FINAL DX SPEC: NORMAL
PATH REPORT.GROSS SPEC: NORMAL

## 2021-04-19 RX ORDER — TRAZODONE HYDROCHLORIDE 50 MG/1
50 TABLET ORAL EVERY EVENING
Qty: 90 TABLET | Refills: 1 | Status: SHIPPED | OUTPATIENT
Start: 2021-04-19 | End: 2021-10-18

## 2021-06-15 DIAGNOSIS — E03.9 HYPOTHYROIDISM, UNSPECIFIED TYPE: ICD-10-CM

## 2021-06-15 RX ORDER — LEVOTHYROXINE SODIUM 88 UG/1
TABLET ORAL
Qty: 90 TABLET | Refills: 1 | Status: SHIPPED | OUTPATIENT
Start: 2021-06-15 | End: 2021-10-16 | Stop reason: SDUPTHER

## 2021-06-28 DIAGNOSIS — E78.5 HYPERLIPIDEMIA, UNSPECIFIED HYPERLIPIDEMIA TYPE: ICD-10-CM

## 2021-06-28 RX ORDER — ROSUVASTATIN CALCIUM 10 MG/1
TABLET, COATED ORAL
Qty: 90 TABLET | Refills: 1 | Status: SHIPPED | OUTPATIENT
Start: 2021-06-28 | End: 2021-10-15

## 2021-08-13 DIAGNOSIS — I10 ESSENTIAL HYPERTENSION: ICD-10-CM

## 2021-08-13 RX ORDER — OLMESARTAN MEDOXOMIL 20 MG/1
TABLET ORAL
Qty: 90 TABLET | Refills: 1 | Status: SHIPPED | OUTPATIENT
Start: 2021-08-13 | End: 2022-02-08

## 2021-08-26 ENCOUNTER — TELEPHONE (OUTPATIENT)
Dept: FAMILY MEDICINE CLINIC | Facility: CLINIC | Age: 56
End: 2021-08-26

## 2021-08-26 NOTE — TELEPHONE ENCOUNTER
Caller: Sierra Teague    Relationship to patient: Self    Best call back number: [phone]    Date of exposure: WED AUG 18    Date of positive COVID19 test: TODAY AUG 26    Date if possible COVID19 exposure: WED AUG 18    COVID19 symptoms: RUNNY NOSE, BODY ACHES, FEVER, CONGESTION, CHILLS, LOSS OF SMELL    Date of initial quarantine: AUG 25    Additional information or concerns: PATIENT WOULD LIKE TO HAVE DIRECTION ON WHAT SHE SHOULD DO.    IF THERE IS ANY MEDICATION THAT CAN HELP THE SYMPTOMS THAT WOULD BE HELPFUL.    PATIENT DID HOME TEST (POSITIVE) AND THEN FOLLOW UP TODAY WITH A TEST FROM A CLINIC , IT WAS POSITIVE AS WELL.    What is the patients preferred pharmacy:     [facility]/pharmacy #[id number] - [place], [place] - [address] - [phone]  - [phone]   [phone]

## 2021-10-15 ENCOUNTER — OFFICE VISIT (OUTPATIENT)
Dept: FAMILY MEDICINE CLINIC | Facility: CLINIC | Age: 56
End: 2021-10-15

## 2021-10-15 VITALS
SYSTOLIC BLOOD PRESSURE: 150 MMHG | DIASTOLIC BLOOD PRESSURE: 100 MMHG | BODY MASS INDEX: 33.66 KG/M2 | OXYGEN SATURATION: 99 % | HEART RATE: 61 BPM | HEIGHT: 65 IN | RESPIRATION RATE: 16 BRPM | WEIGHT: 202 LBS

## 2021-10-15 DIAGNOSIS — I10 PRIMARY HYPERTENSION: ICD-10-CM

## 2021-10-15 DIAGNOSIS — Z23 NEED FOR VACCINATION: Primary | ICD-10-CM

## 2021-10-15 DIAGNOSIS — E78.2 MIXED HYPERLIPIDEMIA: ICD-10-CM

## 2021-10-15 DIAGNOSIS — Z12.11 SCREENING FOR COLON CANCER: ICD-10-CM

## 2021-10-15 DIAGNOSIS — E03.9 HYPOTHYROIDISM, UNSPECIFIED TYPE: ICD-10-CM

## 2021-10-15 PROCEDURE — 90471 IMMUNIZATION ADMIN: CPT | Performed by: NURSE PRACTITIONER

## 2021-10-15 PROCEDURE — 90686 IIV4 VACC NO PRSV 0.5 ML IM: CPT | Performed by: NURSE PRACTITIONER

## 2021-10-15 PROCEDURE — 99214 OFFICE O/P EST MOD 30 MIN: CPT | Performed by: NURSE PRACTITIONER

## 2021-10-15 RX ORDER — POLYMYXIN B SULFATE AND TRIMETHOPRIM 1; 10000 MG/ML; [USP'U]/ML
SOLUTION OPHTHALMIC
COMMUNITY
Start: 2021-08-22 | End: 2022-04-21

## 2021-10-15 NOTE — PROGRESS NOTES
"Subjective  Answers for HPI/ROS submitted by the patient on 10/8/2021  Please describe your symptoms.: Thyroid. Flu shot  Have you had these symptoms before?: Yes  How long have you been having these symptoms?: Greater than 2 weeks  Please list any medications you are currently taking for this condition.: Levothyroxin, Traxofone, Olmesartan Medoxomil  Please describe any probable cause for these symptoms. : Overall check up after having Covid  What is the primary reason for your visit?: Tiffany Teague is a 56 y.o. female.     History of Present Illness   Patient presents for follow-up for hypothyroidism, ongoing. Her last TSH was .296. She reports that she feels \"edgy\".  Her blood pressure is mildly elevated at visit. He denies any chest pain, shortness of breath, dizziness, headache or palpations. Patient is taking her levothyroxine daily on an empty stomach.     Patient is also being seen for follow-up for hypertension, ongoing, elevated at visit. She reports that she does not check her blood pressure regularly at home. Patient is taking olmesartan 20mg 1p.o. QD. She denies any chest pain, shortness of breath, dizziness, palpitations or headache.     Patient is also being seen for follow-up for hyperlipidemia, chronic, ongoing. She reports that she stopped her crestor due to her legs aching. She states that once she stopped this the myagias resolved.  Patient reports that it has been some time since she has taken the medication.     Patient is due for her colorectal cancer screening. She reports that she is high risk and will be due next month. Patient has been to Dr. Lawrence previously for this.  She denies any rectal pain or bleeding.     The following portions of the patient's history were reviewed and updated as appropriate: allergies, current medications, past family history, past medical history, past social history, past surgical history and problem list.    Review of Systems   Constitutional: " Negative for chills, fatigue, fever, unexpected weight gain and unexpected weight loss.   Respiratory: Negative for cough, chest tightness, shortness of breath and wheezing.    Cardiovascular: Negative for chest pain, palpitations and leg swelling.   Gastrointestinal: Negative for abdominal pain, anal bleeding, blood in stool, constipation, diarrhea, nausea and vomiting.   Neurological: Negative for dizziness and headache.   Hematological: Negative.    Psychiatric/Behavioral: Negative.  Negative for sleep disturbance.       Objective   Physical Exam  Vitals and nursing note reviewed.   Constitutional:       Appearance: Normal appearance. She is well-developed and overweight.   HENT:      Head: Normocephalic and atraumatic.   Eyes:      Conjunctiva/sclera: Conjunctivae normal.      Pupils: Pupils are equal, round, and reactive to light.   Cardiovascular:      Rate and Rhythm: Normal rate and regular rhythm.      Heart sounds: Normal heart sounds. No murmur heard.      Pulmonary:      Effort: Pulmonary effort is normal.      Breath sounds: Normal breath sounds.   Neurological:      Mental Status: She is alert and oriented to person, place, and time.   Psychiatric:         Behavior: Behavior normal.         Thought Content: Thought content normal.         Judgment: Judgment normal.         Vitals:    10/15/21 1302   BP: 150/100   Pulse: 61   Resp: 16   SpO2: 99%     Body mass index is 33.65 kg/m².    Procedures    Assessment/Plan   Problems Addressed this Visit        Cardiac and Vasculature    Hyperlipidemia    Relevant Orders    Lipid Panel With / Chol / HDL Ratio       Endocrine and Metabolic    Hypothyroidism    Relevant Orders    TSH      Other Visit Diagnoses     Need for vaccination    -  Primary    Relevant Orders    FluLaval/Fluarix/Fluzone >6 Months (Completed)    Primary hypertension        Relevant Orders    Comprehensive Metabolic Panel    Screening for colon cancer        Relevant Orders    Ambulatory  Referral to Gastroenterology      Diagnoses       Codes Comments    Need for vaccination    -  Primary ICD-10-CM: Z23  ICD-9-CM: V05.9     Hypothyroidism, unspecified type     ICD-10-CM: E03.9  ICD-9-CM: 244.9     Mixed hyperlipidemia     ICD-10-CM: E78.2  ICD-9-CM: 272.2     Primary hypertension     ICD-10-CM: I10  ICD-9-CM: 401.9     Screening for colon cancer     ICD-10-CM: Z12.11  ICD-9-CM: V76.51         TSH  CMP  Lipid panel  Flu vaccine  Referral for colonoscopy.  Patient is to monitor blood pressure at home and call if consistently greater than 140/90.         Return in about 6 months (around 4/15/2022) for Annual, Labs.     Patient Instructions   Patient is to monitor blood pressure at home and call if consistently greater than 140/90.  If you develop chest pain, shortness of breath, increased dizziness, palpitations or a headache that will not resolve, go to ER.   Call with any questions or concerns.   Return in about 6 months (around 4/15/2022) for Annual, Labs.

## 2021-10-15 NOTE — PATIENT INSTRUCTIONS
Patient is to monitor blood pressure at home and call if consistently greater than 140/90.  If you develop chest pain, shortness of breath, increased dizziness, palpitations or a headache that will not resolve, go to ER.   Call with any questions or concerns.   Return in about 6 months (around 4/15/2022) for Annual, Labs.

## 2021-10-16 DIAGNOSIS — E03.9 HYPOTHYROIDISM, UNSPECIFIED TYPE: ICD-10-CM

## 2021-10-16 LAB
ALBUMIN SERPL-MCNC: 4.5 G/DL (ref 3.5–5.2)
ALBUMIN/GLOB SERPL: 2 G/DL
ALP SERPL-CCNC: 45 U/L (ref 39–117)
ALT SERPL-CCNC: 20 U/L (ref 1–33)
AST SERPL-CCNC: 18 U/L (ref 1–32)
BILIRUB SERPL-MCNC: 0.7 MG/DL (ref 0–1.2)
BUN SERPL-MCNC: 21 MG/DL (ref 6–20)
BUN/CREAT SERPL: 22.6 (ref 7–25)
CALCIUM SERPL-MCNC: 9.6 MG/DL (ref 8.6–10.5)
CHLORIDE SERPL-SCNC: 103 MMOL/L (ref 98–107)
CHOLEST SERPL-MCNC: 215 MG/DL (ref 0–200)
CHOLEST/HDLC SERPL: 3.98 {RATIO}
CO2 SERPL-SCNC: 24.6 MMOL/L (ref 22–29)
CREAT SERPL-MCNC: 0.93 MG/DL (ref 0.57–1)
GLOBULIN SER CALC-MCNC: 2.3 GM/DL
GLUCOSE SERPL-MCNC: 90 MG/DL (ref 65–99)
HDLC SERPL-MCNC: 54 MG/DL (ref 40–60)
LDLC SERPL CALC-MCNC: 151 MG/DL (ref 0–100)
POTASSIUM SERPL-SCNC: 4.1 MMOL/L (ref 3.5–5.2)
PROT SERPL-MCNC: 6.8 G/DL (ref 6–8.5)
SODIUM SERPL-SCNC: 139 MMOL/L (ref 136–145)
TRIGL SERPL-MCNC: 56 MG/DL (ref 0–150)
TSH SERPL DL<=0.005 MIU/L-ACNC: 1.86 UIU/ML (ref 0.27–4.2)
VLDLC SERPL CALC-MCNC: 10 MG/DL (ref 5–40)

## 2021-10-16 RX ORDER — LEVOTHYROXINE SODIUM 88 UG/1
88 TABLET ORAL DAILY
Qty: 90 TABLET | Refills: 1 | Status: SHIPPED | OUTPATIENT
Start: 2021-10-16 | End: 2022-04-25 | Stop reason: DRUGHIGH

## 2021-10-17 DIAGNOSIS — F51.04 PSYCHOPHYSIOLOGICAL INSOMNIA: ICD-10-CM

## 2021-10-18 RX ORDER — TRAZODONE HYDROCHLORIDE 50 MG/1
TABLET ORAL
Qty: 90 TABLET | Refills: 1 | Status: SHIPPED | OUTPATIENT
Start: 2021-10-18 | End: 2022-04-12

## 2022-01-12 DIAGNOSIS — E78.5 HYPERLIPIDEMIA, UNSPECIFIED HYPERLIPIDEMIA TYPE: ICD-10-CM

## 2022-01-12 RX ORDER — ROSUVASTATIN CALCIUM 10 MG/1
TABLET, COATED ORAL
Qty: 90 TABLET | Refills: 1 | OUTPATIENT
Start: 2022-01-12

## 2022-01-13 ENCOUNTER — TELEHEALTH PROVIDED OTHER THAN IN PATIENT'S HOME (OUTPATIENT)
Dept: URBAN - METROPOLITAN AREA TELEHEALTH 6 | Facility: TELEHEALTH | Age: 57
End: 2022-01-13

## 2022-01-13 VITALS — HEIGHT: 64 IN

## 2022-01-13 DIAGNOSIS — R13.10 DYSPHAGIA, UNSPECIFIED: ICD-10-CM

## 2022-01-13 PROCEDURE — 99204 OFFICE O/P NEW MOD 45 MIN: CPT | Mod: 95 | Performed by: INTERNAL MEDICINE

## 2022-01-13 RX ORDER — OMEPRAZOLE 20 MG/1
CAPSULE, DELAYED RELEASE ORAL
Qty: 90 | Refills: 3 | Status: COMPLETED
End: 2022-03-10

## 2022-02-08 DIAGNOSIS — I10 ESSENTIAL HYPERTENSION: ICD-10-CM

## 2022-02-08 RX ORDER — OLMESARTAN MEDOXOMIL 20 MG/1
TABLET ORAL
Qty: 90 TABLET | Refills: 1 | Status: SHIPPED | OUTPATIENT
Start: 2022-02-08 | End: 2022-08-15

## 2022-03-07 VITALS
TEMPERATURE: 97.2 F | SYSTOLIC BLOOD PRESSURE: 149 MMHG | OXYGEN SATURATION: 93 % | RESPIRATION RATE: 17 BRPM | HEART RATE: 59 BPM | SYSTOLIC BLOOD PRESSURE: 112 MMHG | TEMPERATURE: 97.3 F | RESPIRATION RATE: 14 BRPM | OXYGEN SATURATION: 95 % | RESPIRATION RATE: 15 BRPM | SYSTOLIC BLOOD PRESSURE: 159 MMHG | DIASTOLIC BLOOD PRESSURE: 93 MMHG | HEART RATE: 63 BPM | OXYGEN SATURATION: 97 % | RESPIRATION RATE: 12 BRPM | SYSTOLIC BLOOD PRESSURE: 95 MMHG | SYSTOLIC BLOOD PRESSURE: 121 MMHG | WEIGHT: 198 LBS | DIASTOLIC BLOOD PRESSURE: 58 MMHG | HEART RATE: 69 BPM | DIASTOLIC BLOOD PRESSURE: 65 MMHG | DIASTOLIC BLOOD PRESSURE: 68 MMHG | OXYGEN SATURATION: 99 % | OXYGEN SATURATION: 96 % | SYSTOLIC BLOOD PRESSURE: 139 MMHG | DIASTOLIC BLOOD PRESSURE: 88 MMHG | HEART RATE: 70 BPM | HEIGHT: 64 IN | DIASTOLIC BLOOD PRESSURE: 75 MMHG | HEART RATE: 66 BPM | SYSTOLIC BLOOD PRESSURE: 107 MMHG | DIASTOLIC BLOOD PRESSURE: 54 MMHG | RESPIRATION RATE: 16 BRPM | HEART RATE: 71 BPM

## 2022-03-10 ENCOUNTER — AMBULATORY SURGICAL CENTER (OUTPATIENT)
Dept: URBAN - METROPOLITAN AREA SURGERY 17 | Facility: SURGERY | Age: 57
End: 2022-03-10
Payer: COMMERCIAL

## 2022-03-10 ENCOUNTER — OFFICE (OUTPATIENT)
Dept: URBAN - METROPOLITAN AREA PATHOLOGY 4 | Facility: PATHOLOGY | Age: 57
End: 2022-03-10

## 2022-03-10 DIAGNOSIS — K20.80 OTHER ESOPHAGITIS WITHOUT BLEEDING: ICD-10-CM

## 2022-03-10 DIAGNOSIS — Z80.0 FAMILY HISTORY OF MALIGNANT NEOPLASM OF DIGESTIVE ORGANS: ICD-10-CM

## 2022-03-10 DIAGNOSIS — Z83.71 FAMILY HISTORY OF COLONIC POLYPS: ICD-10-CM

## 2022-03-10 DIAGNOSIS — R13.10 DYSPHAGIA, UNSPECIFIED: ICD-10-CM

## 2022-03-10 DIAGNOSIS — K57.30 DIVERTICULOSIS OF LARGE INTESTINE WITHOUT PERFORATION OR ABS: ICD-10-CM

## 2022-03-10 PROBLEM — Z12.11 SCREENING FOR COLONIC NEOPLASIA: Status: ACTIVE | Noted: 2022-03-10

## 2022-03-10 LAB
GI HISTOLOGY: A. UNSPECIFIED: (no result)
GI HISTOLOGY: B. UNSPECIFIED: (no result)
GI HISTOLOGY: PDF REPORT: (no result)

## 2022-03-10 PROCEDURE — 43249 ESOPH EGD DILATION <30 MM: CPT | Performed by: INTERNAL MEDICINE

## 2022-03-10 PROCEDURE — 88305 TISSUE EXAM BY PATHOLOGIST: CPT | Performed by: INTERNAL MEDICINE

## 2022-03-10 PROCEDURE — 45378 DIAGNOSTIC COLONOSCOPY: CPT | Mod: 33 | Performed by: INTERNAL MEDICINE

## 2022-04-12 DIAGNOSIS — F51.04 PSYCHOPHYSIOLOGICAL INSOMNIA: ICD-10-CM

## 2022-04-12 RX ORDER — TRAZODONE HYDROCHLORIDE 50 MG/1
TABLET ORAL
Qty: 90 TABLET | Refills: 0 | Status: SHIPPED | OUTPATIENT
Start: 2022-04-12 | End: 2022-07-20

## 2022-04-21 ENCOUNTER — OFFICE VISIT (OUTPATIENT)
Dept: FAMILY MEDICINE CLINIC | Facility: CLINIC | Age: 57
End: 2022-04-21

## 2022-04-21 VITALS
SYSTOLIC BLOOD PRESSURE: 132 MMHG | HEIGHT: 64 IN | HEART RATE: 66 BPM | DIASTOLIC BLOOD PRESSURE: 98 MMHG | WEIGHT: 201 LBS | BODY MASS INDEX: 34.31 KG/M2 | OXYGEN SATURATION: 99 %

## 2022-04-21 DIAGNOSIS — Z13.220 SCREENING FOR HYPERLIPIDEMIA: ICD-10-CM

## 2022-04-21 DIAGNOSIS — Z00.00 ANNUAL PHYSICAL EXAM: Primary | ICD-10-CM

## 2022-04-21 DIAGNOSIS — E03.9 ACQUIRED HYPOTHYROIDISM: ICD-10-CM

## 2022-04-21 DIAGNOSIS — Z13.228 ENCOUNTER FOR SCREENING FOR OTHER METABOLIC DISORDERS: ICD-10-CM

## 2022-04-21 DIAGNOSIS — I10 PRIMARY HYPERTENSION: ICD-10-CM

## 2022-04-21 DIAGNOSIS — Z12.31 ENCOUNTER FOR SCREENING MAMMOGRAM FOR MALIGNANT NEOPLASM OF BREAST: ICD-10-CM

## 2022-04-21 PROCEDURE — 99396 PREV VISIT EST AGE 40-64: CPT | Performed by: NURSE PRACTITIONER

## 2022-04-21 NOTE — PATIENT INSTRUCTIONS
I will call you with your lab results.   Please call with any questions or concerns.    Return in about 6 months (around 10/21/2022) for Recheck BP.    Annual Wellness  Personal Prevention Plan of Service     Date of Office Visit:    Encounter Provider:  MINGO Chaidez  Place of Service:  [facility]  Patient Name: Sierra Teague  :  1965    As part of the Annual Wellness portion of your visit today, we are providing you with this personalized preventive plan of services (PPPS). This plan is based upon recommendations of the [place] Preventive Services Task Force (USPSTF) and the Advisory Committee on Immunization Practices (ACIP).    This lists the preventive care services that should be considered, and provides dates of when you are due. Items listed as completed are up-to-date and do not require any further intervention.    Health Maintenance   Topic Date Due    ZOSTER VACCINE (1 of 2) Never done    PAP SMEAR  2021    COVID-19 Vaccine (3 - Booster for Moderna series) 2021    MAMMOGRAM  2022    INFLUENZA VACCINE  2022    LIPID PANEL  10/15/2022    ANNUAL PHYSICAL  2023    COLORECTAL CANCER SCREENING  03/10/2027    TDAP/TD VACCINES (2 - Td or Tdap) 2028    HEPATITIS C SCREENING  Completed    Pneumococcal Vaccine 0-64  Aged Out       Orders Placed This Encounter   Procedures    Mammo Screening Digital Tomosynthesis Bilateral With CAD     Order Specific Question:   Reason for Exam:     Answer:   Breast Cancer Screening     Order Specific Question:   Release to patient     Answer:   Immediate    Comprehensive Metabolic Panel     Order Specific Question:   Release to patient     Answer:   Immediate    Lipid Panel With / Chol / HDL Ratio     Order Specific Question:   Release to patient     Answer:   Immediate    TSH     Order Specific Question:   Release to patient     Answer:   Immediate    CBC & Differential       Return in about 6  months (around 10/21/2022) for Recheck BP.

## 2022-04-21 NOTE — PROGRESS NOTES
Preventive Exam    History of Present Illness: Sierra Teague is a 56 y.o. here for check up and review of routine health maintenance. she states she is doing well and has no concerns.    Past medical history, surgical history and family history have been reviewed.     Review of Systems   Constitutional: Negative for appetite change, chills, fatigue and fever.   HENT: Negative for congestion, dental problem, hearing loss, postnasal drip, rhinorrhea, sinus pressure, sore throat, trouble swallowing and voice change.         Dental exam is up to date.    Eyes: Negative.  Negative for blurred vision, double vision and photophobia.        Eye exam is up to date.    Respiratory: Negative for cough, chest tightness, shortness of breath and wheezing.    Cardiovascular: Negative for chest pain, palpitations and leg swelling.   Gastrointestinal: Negative for abdominal pain, constipation, diarrhea, nausea and vomiting.   Endocrine: Negative.  Negative for cold intolerance and heat intolerance.   Genitourinary: Negative.  Negative for breast lump, breast pain, decreased libido, dysuria, frequency, menstrual problem, pelvic pain, pelvic pressure, vaginal bleeding and vaginal discharge.   Musculoskeletal: Negative for arthralgias, back pain, joint swelling and myalgias.   Skin: Negative.    Allergic/Immunologic: Negative.  Negative for environmental allergies and food allergies.   Neurological: Negative for dizziness, seizures, weakness, numbness and headache.   Hematological: Negative.  Does not bruise/bleed easily.   Psychiatric/Behavioral: Negative.  Negative for sleep disturbance, suicidal ideas and depressed mood. The patient is not nervous/anxious.        PHYSICAL EXAM    Vitals:    04/21/22 1453   BP: 132/98   Pulse: 66   SpO2: 99%   Body mass index is 34.5 kg/m².      Physical Exam  Vitals and nursing note reviewed.   Constitutional:       Appearance: Normal appearance. She is well-developed and overweight.   HENT:       Head: Normocephalic and atraumatic.      Right Ear: Tympanic membrane, ear canal and external ear normal.      Left Ear: Tympanic membrane, ear canal and external ear normal.      Nose: Nose normal.      Mouth/Throat:      Lips: Pink.      Mouth: Mucous membranes are moist.      Tongue: No lesions.      Palate: No mass and lesions.      Pharynx: Oropharynx is clear. Uvula midline.      Tonsils: No tonsillar exudate.   Eyes:      Conjunctiva/sclera: Conjunctivae normal.      Pupils: Pupils are equal, round, and reactive to light.   Neck:      Thyroid: No thyromegaly.   Cardiovascular:      Rate and Rhythm: Normal rate and regular rhythm.      Pulses: Normal pulses.           Dorsalis pedis pulses are 2+ on the right side and 2+ on the left side.        Posterior tibial pulses are 2+ on the right side and 2+ on the left side.      Heart sounds: Normal heart sounds. No murmur heard.  Pulmonary:      Effort: Pulmonary effort is normal.      Breath sounds: Normal breath sounds.   Chest:   Breasts:      Right: No supraclavicular adenopathy.      Left: No supraclavicular adenopathy.       Abdominal:      General: Bowel sounds are normal. There is no distension.      Palpations: Abdomen is soft.      Tenderness: There is no abdominal tenderness.   Musculoskeletal:         General: No deformity. Normal range of motion.      Cervical back: Normal range of motion and neck supple.      Right lower leg: No edema.      Left lower leg: No edema.   Lymphadenopathy:      Head:      Right side of head: No submental, submandibular, tonsillar, preauricular, posterior auricular or occipital adenopathy.      Left side of head: No submental, submandibular, tonsillar, preauricular, posterior auricular or occipital adenopathy.      Cervical: No cervical adenopathy.      Right cervical: No superficial, deep or posterior cervical adenopathy.     Left cervical: No superficial, deep or posterior cervical adenopathy.      Upper Body:      Right  upper body: No supraclavicular adenopathy.      Left upper body: No supraclavicular adenopathy.   Skin:     General: Skin is warm and dry.      Capillary Refill: Capillary refill takes 2 to 3 seconds.   Neurological:      General: No focal deficit present.      Mental Status: She is alert and oriented to person, place, and time.      Cranial Nerves: Cranial nerves are intact. No cranial nerve deficit.      Sensory: Sensation is intact.      Motor: Motor function is intact.      Coordination: Coordination is intact.      Gait: Gait is intact.   Psychiatric:         Attention and Perception: Attention and perception normal.         Mood and Affect: Mood normal.         Speech: Speech normal.         Behavior: Behavior normal. Behavior is cooperative.         Thought Content: Thought content normal.         Cognition and Memory: Cognition and memory normal.         Judgment: Judgment normal.         Procedures    Diagnoses and all orders for this visit:    1. Annual physical exam (Primary)  -     CBC & Differential  -     Comprehensive Metabolic Panel  -     Lipid Panel With / Chol / HDL Ratio  -     TSH    2. Encounter for screening mammogram for malignant neoplasm of breast  -     Mammo Screening Digital Tomosynthesis Bilateral With CAD    3. Screening for hyperlipidemia  -     Lipid Panel With / Chol / HDL Ratio    4. Primary hypertension  -     Comprehensive Metabolic Panel    5. Encounter for screening for other metabolic disorders  -     CBC & Differential    6. Acquired hypothyroidism  -     TSH        Problems Addressed this Visit        Endocrine and Metabolic    Hypothyroidism    Relevant Orders    TSH      Other Visit Diagnoses     Annual physical exam    -  Primary    Relevant Orders    CBC & Differential    Comprehensive Metabolic Panel    Lipid Panel With / Chol / HDL Ratio    TSH    Encounter for screening mammogram for malignant neoplasm of breast        Relevant Orders    Mammo Screening Digital  Tomosynthesis Bilateral With CAD    Screening for hyperlipidemia        Relevant Orders    Lipid Panel With / Chol / HDL Ratio    Primary hypertension        Relevant Orders    Comprehensive Metabolic Panel    Encounter for screening for other metabolic disorders        Relevant Orders    CBC & Differential      Diagnoses       Codes Comments    Annual physical exam    -  Primary ICD-10-CM: Z00.00  ICD-9-CM: V70.0     Encounter for screening mammogram for malignant neoplasm of breast     ICD-10-CM: Z12.31  ICD-9-CM: V76.12     Screening for hyperlipidemia     ICD-10-CM: Z13.220  ICD-9-CM: V77.91     Primary hypertension     ICD-10-CM: I10  ICD-9-CM: 401.9     Encounter for screening for other metabolic disorders     ICD-10-CM: Z13.228  ICD-9-CM: V77.99     Acquired hypothyroidism     ICD-10-CM: E03.9  ICD-9-CM: 244.9         CMP  CBC  Lipid panel  TSH  Mammogram    Routine health maintenance reviewed and discussed with Sierra Teague.    Preventative counseling regarding healthy diet and exercise.   Pt reports that he wears a seatbelt regularly.  Return in about 6 months (around 10/21/2022) for Recheck BP.

## 2022-04-22 LAB
ALBUMIN SERPL-MCNC: 4.7 G/DL (ref 3.8–4.9)
ALBUMIN/GLOB SERPL: 1.8 {RATIO} (ref 1.2–2.2)
ALP SERPL-CCNC: 50 IU/L (ref 44–121)
ALT SERPL-CCNC: 33 IU/L (ref 0–32)
AST SERPL-CCNC: 28 IU/L (ref 0–40)
BASOPHILS # BLD AUTO: 0 X10E3/UL (ref 0–0.2)
BASOPHILS NFR BLD AUTO: 1 %
BILIRUB SERPL-MCNC: 1 MG/DL (ref 0–1.2)
BUN SERPL-MCNC: 21 MG/DL (ref 6–24)
BUN/CREAT SERPL: 20 (ref 9–23)
CALCIUM SERPL-MCNC: 10 MG/DL (ref 8.7–10.2)
CHLORIDE SERPL-SCNC: 99 MMOL/L (ref 96–106)
CHOLEST SERPL-MCNC: 253 MG/DL (ref 100–199)
CHOLEST/HDLC SERPL: 4.6 RATIO (ref 0–4.4)
CO2 SERPL-SCNC: 21 MMOL/L (ref 20–29)
CREAT SERPL-MCNC: 1.04 MG/DL (ref 0.57–1)
EGFRCR SERPLBLD CKD-EPI 2021: 62 ML/MIN/1.73
EOSINOPHIL # BLD AUTO: 0.2 X10E3/UL (ref 0–0.4)
EOSINOPHIL NFR BLD AUTO: 3 %
ERYTHROCYTE [DISTWIDTH] IN BLOOD BY AUTOMATED COUNT: 12.6 % (ref 11.7–15.4)
GLOBULIN SER CALC-MCNC: 2.6 G/DL (ref 1.5–4.5)
GLUCOSE SERPL-MCNC: 85 MG/DL (ref 65–99)
HCT VFR BLD AUTO: 39.3 % (ref 34–46.6)
HDLC SERPL-MCNC: 55 MG/DL
HGB BLD-MCNC: 13.8 G/DL (ref 11.1–15.9)
IMM GRANULOCYTES # BLD AUTO: 0 X10E3/UL (ref 0–0.1)
IMM GRANULOCYTES NFR BLD AUTO: 0 %
LDLC SERPL CALC-MCNC: 184 MG/DL (ref 0–99)
LYMPHOCYTES # BLD AUTO: 2.7 X10E3/UL (ref 0.7–3.1)
LYMPHOCYTES NFR BLD AUTO: 52 %
MCH RBC QN AUTO: 31.7 PG (ref 26.6–33)
MCHC RBC AUTO-ENTMCNC: 35.1 G/DL (ref 31.5–35.7)
MCV RBC AUTO: 90 FL (ref 79–97)
MONOCYTES # BLD AUTO: 0.4 X10E3/UL (ref 0.1–0.9)
MONOCYTES NFR BLD AUTO: 8 %
NEUTROPHILS # BLD AUTO: 1.9 X10E3/UL (ref 1.4–7)
NEUTROPHILS NFR BLD AUTO: 36 %
PLATELET # BLD AUTO: 283 X10E3/UL (ref 150–450)
POTASSIUM SERPL-SCNC: 5 MMOL/L (ref 3.5–5.2)
PROT SERPL-MCNC: 7.3 G/DL (ref 6–8.5)
RBC # BLD AUTO: 4.36 X10E6/UL (ref 3.77–5.28)
SODIUM SERPL-SCNC: 136 MMOL/L (ref 134–144)
TRIGL SERPL-MCNC: 82 MG/DL (ref 0–149)
TSH SERPL DL<=0.005 MIU/L-ACNC: 6.03 UIU/ML (ref 0.45–4.5)
VLDLC SERPL CALC-MCNC: 14 MG/DL (ref 5–40)
WBC # BLD AUTO: 5.2 X10E3/UL (ref 3.4–10.8)

## 2022-04-25 DIAGNOSIS — E03.9 HYPOTHYROIDISM, UNSPECIFIED TYPE: Primary | ICD-10-CM

## 2022-04-25 RX ORDER — LEVOTHYROXINE SODIUM 0.1 MG/1
100 TABLET ORAL DAILY
Qty: 30 TABLET | Refills: 3 | Status: SHIPPED | OUTPATIENT
Start: 2022-04-25 | End: 2022-07-27

## 2022-05-16 ENCOUNTER — TELEPHONE (OUTPATIENT)
Dept: FAMILY MEDICINE CLINIC | Facility: CLINIC | Age: 57
End: 2022-05-16

## 2022-05-16 NOTE — TELEPHONE ENCOUNTER
Caller: Danielle Elliott    Relationship: Emergency Contact    Best call back number: [phone]     What medication are you requesting: MEDROL DOSE PACK / FLEXERIL     What are your current symptoms: LOWER BACK PAIN    How long have you been experiencing symptoms: 3 DAYS    Have you had these symptoms before:    [x] Yes  [] No    Have you been treated for these symptoms before:   [x] Yes  [] No    If a prescription is needed, what is your preferred pharmacy and phone number: [facility] #[id number] - [place], [place] - [address] - [phone]  - [phone]     IF PATIENT NEEDS AN APPOINTMENT FOR THIS MEDICATION, THE PATIENT WOULD LIKE A VIRTUAL APPOINTMENT DUE TO THE FACT THAT RIDING IN A VEHICLE IS UNBEARABLE FOR HER.

## 2022-05-16 NOTE — TELEPHONE ENCOUNTER
Called pt and informed her that she would need to be seen and Kandis's schedule is full but pt would be going to urgent care.

## 2022-05-28 DIAGNOSIS — E03.9 HYPOTHYROIDISM, UNSPECIFIED TYPE: ICD-10-CM

## 2022-05-31 RX ORDER — LEVOTHYROXINE SODIUM 88 UG/1
TABLET ORAL
Qty: 90 TABLET | Refills: 1 | OUTPATIENT
Start: 2022-05-31

## 2022-07-20 DIAGNOSIS — F51.04 PSYCHOPHYSIOLOGICAL INSOMNIA: ICD-10-CM

## 2022-07-20 RX ORDER — TRAZODONE HYDROCHLORIDE 50 MG/1
TABLET ORAL
Qty: 90 TABLET | Refills: 1 | Status: SHIPPED | OUTPATIENT
Start: 2022-07-20 | End: 2022-12-15 | Stop reason: SDUPTHER

## 2022-07-27 DIAGNOSIS — E03.9 HYPOTHYROIDISM, UNSPECIFIED TYPE: ICD-10-CM

## 2022-07-27 RX ORDER — LEVOTHYROXINE SODIUM 0.1 MG/1
TABLET ORAL
Qty: 90 TABLET | Refills: 1 | Status: SHIPPED | OUTPATIENT
Start: 2022-07-27

## 2022-08-14 DIAGNOSIS — I10 ESSENTIAL HYPERTENSION: ICD-10-CM

## 2022-08-15 RX ORDER — OLMESARTAN MEDOXOMIL 20 MG/1
TABLET ORAL
Qty: 90 TABLET | Refills: 1 | Status: SHIPPED | OUTPATIENT
Start: 2022-08-15 | End: 2023-02-17

## 2022-10-20 ENCOUNTER — OFFICE VISIT (OUTPATIENT)
Dept: FAMILY MEDICINE CLINIC | Facility: CLINIC | Age: 57
End: 2022-10-20

## 2022-10-20 VITALS
WEIGHT: 201 LBS | OXYGEN SATURATION: 100 % | HEART RATE: 53 BPM | SYSTOLIC BLOOD PRESSURE: 118 MMHG | HEIGHT: 64 IN | BODY MASS INDEX: 34.31 KG/M2 | DIASTOLIC BLOOD PRESSURE: 72 MMHG

## 2022-10-20 DIAGNOSIS — I10 ESSENTIAL HYPERTENSION: Primary | ICD-10-CM

## 2022-10-20 DIAGNOSIS — Z12.31 ENCOUNTER FOR SCREENING MAMMOGRAM FOR MALIGNANT NEOPLASM OF BREAST: ICD-10-CM

## 2022-10-20 PROCEDURE — 99213 OFFICE O/P EST LOW 20 MIN: CPT | Performed by: NURSE PRACTITIONER

## 2022-10-20 NOTE — PROGRESS NOTES
Subjective   Sierra Teague is a 57 y.o. female.     History of Present Illness   Patient presents for follow-up for hypertension, ongoing, currently well controlled. She is taking her medications daily and denies any adverse side affects. She denies any side chest pain, shortness of breath, dizziness, palpitations or headaches.    Patient reports some changes to her memory X 1 month. She reports that she is forgetting things like appointments. She reports that she mainly forgets short term things.     The following portions of the patient's history were reviewed and updated as appropriate: allergies, current medications, past family history, past medical history, past social history, past surgical history and problem list.    Review of Systems   Constitutional: Negative for chills, fatigue and fever.   Eyes: Negative for blurred vision and double vision.   Respiratory: Negative for cough, chest tightness, shortness of breath and wheezing.    Cardiovascular: Negative for chest pain, palpitations and leg swelling.   Neurological: Negative for dizziness, weakness and headache.       Objective   Physical Exam  Vitals and nursing note reviewed.   Constitutional:       Appearance: She is well-developed.   HENT:      Head: Normocephalic and atraumatic.   Eyes:      Conjunctiva/sclera: Conjunctivae normal.      Pupils: Pupils are equal, round, and reactive to light.   Neck:      Thyroid: No thyromegaly.   Cardiovascular:      Rate and Rhythm: Normal rate and regular rhythm.      Pulses: Normal pulses.      Heart sounds: Normal heart sounds. No murmur heard.    No friction rub. No gallop.   Pulmonary:      Effort: Pulmonary effort is normal.      Breath sounds: Normal breath sounds.   Musculoskeletal:      Cervical back: Normal range of motion and neck supple.   Lymphadenopathy:      Cervical: No cervical adenopathy.   Skin:     General: Skin is warm and dry.      Capillary Refill: Capillary refill takes 2 to 3 seconds.    Neurological:      Mental Status: She is alert and oriented to person, place, and time.      Cranial Nerves: No cranial nerve deficit.   Psychiatric:         Behavior: Behavior normal.         Thought Content: Thought content normal.         Judgment: Judgment normal.         Vitals:    10/20/22 1519   BP: 118/72   Pulse: 53   SpO2: 100%     Body mass index is 34.5 kg/m².      Procedures    Assessment & Plan   Problems Addressed this Visit        Cardiac and Vasculature    Essential hypertension - Primary   Other Visit Diagnoses     Encounter for screening mammogram for malignant neoplasm of breast        Relevant Orders    Mammo Screening Digital Tomosynthesis Bilateral With CAD      Diagnoses       Codes Comments    Essential hypertension    -  Primary ICD-10-CM: I10  ICD-9-CM: 401.9     Encounter for screening mammogram for malignant neoplasm of breast     ICD-10-CM: Z12.31  ICD-9-CM: V76.12         Mammogram  Continue Benicar 20mg 1p.o.QD       Return in about 6 months (around 4/20/2023) for Annual, Labs.  Answers for HPI/ROS submitted by the patient on 10/20/2022  What is the primary reason for your visit?: Physical

## 2022-10-20 NOTE — PATIENT INSTRUCTIONS
Return in about 6 months (around 4/20/2023) for Annual, Labs.  Call with any questions or concerns.

## 2022-12-15 DIAGNOSIS — F51.04 PSYCHOPHYSIOLOGICAL INSOMNIA: ICD-10-CM

## 2022-12-15 RX ORDER — TRAZODONE HYDROCHLORIDE 50 MG/1
50 TABLET ORAL EVERY EVENING
Qty: 90 TABLET | Refills: 1 | Status: SHIPPED | OUTPATIENT
Start: 2022-12-15 | End: 2022-12-20 | Stop reason: SDUPTHER

## 2022-12-20 DIAGNOSIS — F51.04 PSYCHOPHYSIOLOGICAL INSOMNIA: ICD-10-CM

## 2022-12-20 RX ORDER — TRAZODONE HYDROCHLORIDE 50 MG/1
50 TABLET ORAL EVERY EVENING
Qty: 90 TABLET | Refills: 1 | Status: SHIPPED | OUTPATIENT
Start: 2022-12-20

## 2023-02-17 DIAGNOSIS — I10 ESSENTIAL HYPERTENSION: ICD-10-CM

## 2023-02-17 RX ORDER — OLMESARTAN MEDOXOMIL 20 MG/1
TABLET ORAL
Qty: 90 TABLET | Refills: 1 | Status: SHIPPED | OUTPATIENT
Start: 2023-02-17

## 2023-04-19 ENCOUNTER — OFFICE (OUTPATIENT)
Dept: URBAN - METROPOLITAN AREA CLINIC 76 | Facility: CLINIC | Age: 58
End: 2023-04-19

## 2023-04-19 VITALS
SYSTOLIC BLOOD PRESSURE: 125 MMHG | DIASTOLIC BLOOD PRESSURE: 78 MMHG | HEIGHT: 64 IN | HEART RATE: 65 BPM | WEIGHT: 198 LBS | OXYGEN SATURATION: 97 %

## 2023-04-19 DIAGNOSIS — K64.8 OTHER HEMORRHOIDS: ICD-10-CM

## 2023-04-19 PROCEDURE — 99214 OFFICE O/P EST MOD 30 MIN: CPT

## 2023-04-19 RX ORDER — HYDROCORTISONE ACETATE 25 MG/1
25 SUPPOSITORY RECTAL
Qty: 10 | Refills: 1 | Status: ACTIVE
Start: 2023-04-19

## 2023-04-20 ENCOUNTER — OFFICE VISIT (OUTPATIENT)
Dept: FAMILY MEDICINE CLINIC | Facility: CLINIC | Age: 58
End: 2023-04-20
Payer: COMMERCIAL

## 2023-04-20 VITALS
BODY MASS INDEX: 33.32 KG/M2 | OXYGEN SATURATION: 98 % | HEIGHT: 64 IN | DIASTOLIC BLOOD PRESSURE: 78 MMHG | RESPIRATION RATE: 16 BRPM | HEART RATE: 57 BPM | WEIGHT: 195.2 LBS | SYSTOLIC BLOOD PRESSURE: 120 MMHG

## 2023-04-20 DIAGNOSIS — I10 ESSENTIAL HYPERTENSION: Primary | ICD-10-CM

## 2023-04-20 PROBLEM — R13.10 DYSPHAGIA: Status: ACTIVE | Noted: 2023-04-20

## 2023-04-20 PROBLEM — K57.30 DVRTCLOS OF LG INT W/O PERFORATION OR ABSCESS W/O BLEEDING: Status: ACTIVE | Noted: 2022-03-10

## 2023-04-20 PROBLEM — S72.90XA CLOSED FRACTURE OF FEMUR: Status: ACTIVE | Noted: 2023-04-04

## 2023-04-20 PROBLEM — R10.13 DYSPEPSIA: Status: ACTIVE | Noted: 2023-04-20

## 2023-04-20 PROBLEM — E78.00 PURE HYPERCHOLESTEROLEMIA: Status: ACTIVE | Noted: 2023-04-20

## 2023-04-20 PROBLEM — K64.4 HEMORRHOIDS, EXTERNAL: Status: ACTIVE | Noted: 2023-04-20

## 2023-04-20 PROBLEM — R14.0 BLOATING: Status: ACTIVE | Noted: 2023-04-20

## 2023-04-20 PROBLEM — Z80.0 FAMILY HISTORY OF COLON CANCER: Status: ACTIVE | Noted: 2023-04-20

## 2023-04-20 PROBLEM — R10.10 UPPER ABDOMINAL PAIN, UNSPECIFIED: Status: ACTIVE | Noted: 2023-04-20

## 2023-04-20 PROBLEM — K62.5 RECTAL BLEEDING: Status: ACTIVE | Noted: 2023-04-20

## 2023-04-20 PROCEDURE — 99213 OFFICE O/P EST LOW 20 MIN: CPT | Performed by: NURSE PRACTITIONER

## 2023-04-20 NOTE — PROGRESS NOTES
Subjective   Sierra Teague is a 57 y.o. female.     History of Present Illness   Patient presents for follow-up for hypertension, ongoing, currently well controlled with medication. Patient reports that she takes her medications daily and denies any adverse side affects. She denies any chest pain, shortness of breath, palpitations, headaches.     The following portions of the patient's history were reviewed and updated as appropriate: allergies, current medications, past family history, past medical history, past social history, past surgical history and problem list.    Review of Systems   Constitutional: Negative for chills, fatigue and fever.   Eyes: Negative for blurred vision and double vision.   Respiratory: Negative for cough, chest tightness, shortness of breath and wheezing.    Cardiovascular: Negative for chest pain, palpitations and leg swelling.   Neurological: Negative for dizziness, weakness and headache.       Objective   Physical Exam  Vitals and nursing note reviewed.   Constitutional:       Appearance: Normal appearance. She is well-developed.   HENT:      Head: Normocephalic and atraumatic.   Eyes:      Conjunctiva/sclera: Conjunctivae normal.      Pupils: Pupils are equal, round, and reactive to light.   Neck:      Thyroid: No thyromegaly.   Cardiovascular:      Rate and Rhythm: Normal rate and regular rhythm.      Pulses: Normal pulses.      Heart sounds: Normal heart sounds. No murmur heard.    No friction rub. No gallop.   Pulmonary:      Effort: Pulmonary effort is normal.      Breath sounds: Normal breath sounds.   Musculoskeletal:      Cervical back: Normal range of motion and neck supple.   Lymphadenopathy:      Cervical: No cervical adenopathy.   Skin:     General: Skin is warm and dry.      Capillary Refill: Capillary refill takes 2 to 3 seconds.   Neurological:      Mental Status: She is alert and oriented to person, place, and time.      Cranial Nerves: No cranial nerve deficit.    Psychiatric:         Behavior: Behavior normal.         Thought Content: Thought content normal.         Judgment: Judgment normal.         Vitals:    04/20/23 1556   BP: 120/78   Pulse: 57   Resp: 16   SpO2: 98%     Body mass index is 33.51 kg/m².      Procedures    Assessment & Plan   Problems Addressed this Visit        Cardiac and Vasculature    Essential hypertension - Primary   Diagnoses       Codes Comments    Essential hypertension    -  Primary ICD-10-CM: I10  ICD-9-CM: 401.9         Continue olmesartan 20 mg 1 p.o. QD         Return in about 6 months (around 10/20/2023) for Annual, Labs.  Answers for HPI/ROS submitted by the patient on 4/18/2023  Please describe your symptoms.: I think this is a check up.  Have you had these symptoms before?: No  How long have you been having these symptoms?: 1-4 days  What is the primary reason for your visit?: Other

## 2023-09-19 DIAGNOSIS — F51.04 PSYCHOPHYSIOLOGICAL INSOMNIA: ICD-10-CM

## 2023-09-19 DIAGNOSIS — I10 ESSENTIAL HYPERTENSION: ICD-10-CM

## 2023-09-19 RX ORDER — TRAZODONE HYDROCHLORIDE 50 MG/1
50 TABLET ORAL EVERY EVENING
Qty: 90 TABLET | Refills: 1 | Status: SHIPPED | OUTPATIENT
Start: 2023-09-19

## 2023-09-19 RX ORDER — OLMESARTAN MEDOXOMIL 20 MG/1
TABLET ORAL
Qty: 90 TABLET | Refills: 1 | Status: SHIPPED | OUTPATIENT
Start: 2023-09-19

## 2023-10-26 ENCOUNTER — OFFICE VISIT (OUTPATIENT)
Dept: FAMILY MEDICINE CLINIC | Facility: CLINIC | Age: 58
End: 2023-10-26
Payer: COMMERCIAL

## 2023-10-26 VITALS
SYSTOLIC BLOOD PRESSURE: 110 MMHG | DIASTOLIC BLOOD PRESSURE: 76 MMHG | HEART RATE: 66 BPM | RESPIRATION RATE: 18 BRPM | HEIGHT: 64 IN | BODY MASS INDEX: 33.29 KG/M2 | OXYGEN SATURATION: 98 % | WEIGHT: 195 LBS

## 2023-10-26 DIAGNOSIS — Z23 NEED FOR VACCINATION: ICD-10-CM

## 2023-10-26 DIAGNOSIS — E03.9 HYPOTHYROIDISM, UNSPECIFIED TYPE: ICD-10-CM

## 2023-10-26 DIAGNOSIS — Z00.00 ANNUAL PHYSICAL EXAM: Primary | ICD-10-CM

## 2023-10-26 DIAGNOSIS — E66.9 OBESITY (BMI 30.0-34.9): ICD-10-CM

## 2023-10-26 DIAGNOSIS — Z13.228 ENCOUNTER FOR SCREENING FOR OTHER METABOLIC DISORDERS: ICD-10-CM

## 2023-10-26 DIAGNOSIS — I10 ESSENTIAL HYPERTENSION: ICD-10-CM

## 2023-10-26 PROBLEM — E66.811 OBESITY (BMI 30.0-34.9): Status: ACTIVE | Noted: 2023-10-26

## 2023-10-26 RX ORDER — LEVOTHYROXINE SODIUM 88 UG/1
88 TABLET ORAL DAILY
COMMUNITY
Start: 2023-08-10

## 2023-10-26 NOTE — PROGRESS NOTES
Preventive Exam    History of Present Illness: Sierra Teague is a 58 y.o. here for check up and review of routine health maintenance. she states she is doing well and has no concerns.    Past medical history, surgical history and family history have been reviewed.     Review of Systems   Constitutional:  Negative for appetite change, chills, fatigue and fever.   HENT:  Negative for congestion, dental problem, mouth sores, postnasal drip, sinus pressure, sore throat and trouble swallowing.         Dental exam is up to date.    Eyes: Negative.  Negative for blurred vision, double vision, photophobia and visual disturbance.        Eye exam is up to date.    Respiratory:  Negative for cough, chest tightness, shortness of breath and wheezing.    Cardiovascular:  Negative for chest pain, palpitations and leg swelling.   Gastrointestinal:  Negative for abdominal pain, constipation, diarrhea, nausea and vomiting.   Endocrine: Negative.  Negative for cold intolerance and heat intolerance.   Genitourinary: Negative.  Negative for breast discharge, breast lump, dyspareunia, dysuria, hematuria, menstrual problem, pelvic pain, pelvic pressure, urinary incontinence and vaginal bleeding.   Musculoskeletal:  Negative for arthralgias, back pain, joint swelling and myalgias.   Skin: Negative.    Allergic/Immunologic: Negative.  Negative for environmental allergies and food allergies.   Neurological:  Negative for dizziness, weakness, numbness and headache.   Hematological: Negative.  Does not bruise/bleed easily.   Psychiatric/Behavioral: Negative.  Negative for sleep disturbance, suicidal ideas and depressed mood. The patient is not nervous/anxious.        PHYSICAL EXAM    Vitals:    10/26/23 1553   BP: 110/76   Pulse: 66   Resp: 18   SpO2: 98%       Body mass index is 33.47 kg/m².   BMI is >= 30 and <35. (Class 1 Obesity). The following options were offered after discussion;: exercise counseling/recommendations and nutrition  counseling/recommendations       Physical Exam  Vitals and nursing note reviewed.   Constitutional:       Appearance: Normal appearance. She is well-developed. She is obese.   HENT:      Head: Normocephalic and atraumatic.      Right Ear: Tympanic membrane, ear canal and external ear normal.      Left Ear: Tympanic membrane, ear canal and external ear normal.      Nose: Nose normal.      Mouth/Throat:      Lips: Pink.      Mouth: Mucous membranes are moist.      Tongue: No lesions.      Palate: No mass and lesions.      Pharynx: Oropharynx is clear. Uvula midline.      Tonsils: No tonsillar exudate.   Eyes:      Conjunctiva/sclera: Conjunctivae normal.      Pupils: Pupils are equal, round, and reactive to light.   Neck:      Thyroid: No thyromegaly.   Cardiovascular:      Rate and Rhythm: Normal rate and regular rhythm.      Pulses: Normal pulses.           Dorsalis pedis pulses are 2+ on the right side and 2+ on the left side.        Posterior tibial pulses are 2+ on the right side and 2+ on the left side.      Heart sounds: Normal heart sounds. No murmur heard.     No friction rub. No gallop.   Pulmonary:      Effort: Pulmonary effort is normal.      Breath sounds: Normal breath sounds.   Abdominal:      General: Bowel sounds are normal. There is no distension.      Palpations: Abdomen is soft.      Tenderness: There is no abdominal tenderness.   Musculoskeletal:         General: No deformity. Normal range of motion.      Cervical back: Normal range of motion and neck supple.      Right lower leg: No edema.      Left lower leg: No edema.   Lymphadenopathy:      Head:      Right side of head: No submental, submandibular, tonsillar, preauricular, posterior auricular or occipital adenopathy.      Left side of head: No submental, submandibular, tonsillar, preauricular, posterior auricular or occipital adenopathy.      Cervical: No cervical adenopathy.      Right cervical: No superficial, deep or posterior cervical  adenopathy.     Left cervical: No superficial, deep or posterior cervical adenopathy.      Upper Body:      Right upper body: No supraclavicular adenopathy.      Left upper body: No supraclavicular adenopathy.   Skin:     General: Skin is warm and dry.      Capillary Refill: Capillary refill takes 2 to 3 seconds.   Neurological:      General: No focal deficit present.      Mental Status: She is alert and oriented to person, place, and time.      Cranial Nerves: No cranial nerve deficit.      Sensory: Sensation is intact.      Motor: Motor function is intact.      Coordination: Coordination is intact.      Gait: Gait is intact.   Psychiatric:         Attention and Perception: Attention and perception normal.         Mood and Affect: Mood and affect normal.         Speech: Speech normal.         Behavior: Behavior normal. Behavior is cooperative.         Thought Content: Thought content normal.         Cognition and Memory: Cognition and memory normal.         Judgment: Judgment normal.         Procedures    Diagnoses and all orders for this visit:    1. Annual physical exam (Primary)  -     Lipid Panel With LDL/HDL Ratio    2. Need for vaccination  -     COVID-19 F23 (Pfizer) 12yrs+ (COMIRNATY)  -     Fluzone >6 Months (6203-3252)    3. Essential hypertension  -     Comprehensive Metabolic Panel    4. Hypothyroidism, unspecified type  -     TSH  -     T4, Free    5. Encounter for screening for other metabolic disorders  -     CBC & Differential    6. Obesity (BMI 30.0-34.9)        Problems Addressed this Visit          Cardiac and Vasculature    Essential hypertension    Relevant Orders    Comprehensive Metabolic Panel       Endocrine and Metabolic    Hypothyroidism    Relevant Medications    levothyroxine (SYNTHROID, LEVOTHROID) 88 MCG tablet    Other Relevant Orders    TSH    T4, Free    Obesity (BMI 30.0-34.9)     Other Visit Diagnoses       Annual physical exam    -  Primary    Relevant Orders    Lipid Panel With  LDL/HDL Ratio    Need for vaccination        Relevant Orders    COVID-19 F23 (Pfizer) 12yrs+ (COMIRNATY)    Fluzone >6 Months (3136-5634)    Encounter for screening for other metabolic disorders        Relevant Orders    CBC & Differential          Diagnoses         Codes Comments    Annual physical exam    -  Primary ICD-10-CM: Z00.00  ICD-9-CM: V70.0     Need for vaccination     ICD-10-CM: Z23  ICD-9-CM: V05.9     Essential hypertension     ICD-10-CM: I10  ICD-9-CM: 401.9     Hypothyroidism, unspecified type     ICD-10-CM: E03.9  ICD-9-CM: 244.9     Encounter for screening for other metabolic disorders     ICD-10-CM: Z13.228  ICD-9-CM: V77.99     Obesity (BMI 30.0-34.9)     ICD-10-CM: E66.9  ICD-9-CM: 278.00           Lipid panel  CMP  CBC  TSH  T4  Flu vaccine  Covid Vaccine  Routine health maintenance reviewed and discussed with Sierra Teague.    Preventative counseling regarding healthy diet and exercise.   Pt reports that he wears a seatbelt regularly.     Return in about 6 months (around 4/26/2024) for Recheck BP.

## 2023-10-26 NOTE — PATIENT INSTRUCTIONS
I will call call or send [employer] message with your lab results.   Please call with any questions or concerns.    Return in about 6 months (around 4/26/2024) for Recheck BP.

## 2023-10-27 LAB
ALBUMIN SERPL-MCNC: 4.3 G/DL (ref 3.8–4.9)
ALBUMIN/GLOB SERPL: 1.9 {RATIO} (ref 1.2–2.2)
ALP SERPL-CCNC: 57 IU/L (ref 44–121)
ALT SERPL-CCNC: 17 IU/L (ref 0–32)
AST SERPL-CCNC: 18 IU/L (ref 0–40)
BASOPHILS # BLD AUTO: 0 X10E3/UL (ref 0–0.2)
BASOPHILS NFR BLD AUTO: 1 %
BILIRUB SERPL-MCNC: 0.7 MG/DL (ref 0–1.2)
BUN SERPL-MCNC: 27 MG/DL (ref 8–27)
BUN/CREAT SERPL: 25 (ref 12–28)
CALCIUM SERPL-MCNC: 9.4 MG/DL (ref 8.7–10.3)
CHLORIDE SERPL-SCNC: 104 MMOL/L (ref 96–106)
CHOLEST SERPL-MCNC: 205 MG/DL (ref 100–199)
CO2 SERPL-SCNC: 21 MMOL/L (ref 20–29)
CREAT SERPL-MCNC: 1.08 MG/DL (ref 0.57–1)
EGFRCR SERPLBLD CKD-EPI 2021: 59 ML/MIN/1.73
EOSINOPHIL # BLD AUTO: 0.1 X10E3/UL (ref 0–0.4)
EOSINOPHIL NFR BLD AUTO: 2 %
ERYTHROCYTE [DISTWIDTH] IN BLOOD BY AUTOMATED COUNT: 12.9 % (ref 11.7–15.4)
GLOBULIN SER CALC-MCNC: 2.3 G/DL (ref 1.5–4.5)
GLUCOSE SERPL-MCNC: 93 MG/DL (ref 70–99)
HCT VFR BLD AUTO: 35.7 % (ref 34–46.6)
HDLC SERPL-MCNC: 50 MG/DL
HGB BLD-MCNC: 12.3 G/DL (ref 11.1–15.9)
IMM GRANULOCYTES # BLD AUTO: 0 X10E3/UL (ref 0–0.1)
IMM GRANULOCYTES NFR BLD AUTO: 0 %
LDLC SERPL CALC-MCNC: 133 MG/DL (ref 0–99)
LDLC/HDLC SERPL: 2.7 RATIO (ref 0–3.2)
LYMPHOCYTES # BLD AUTO: 2.4 X10E3/UL (ref 0.7–3.1)
LYMPHOCYTES NFR BLD AUTO: 43 %
MCH RBC QN AUTO: 31.1 PG (ref 26.6–33)
MCHC RBC AUTO-ENTMCNC: 34.5 G/DL (ref 31.5–35.7)
MCV RBC AUTO: 90 FL (ref 79–97)
MONOCYTES # BLD AUTO: 0.4 X10E3/UL (ref 0.1–0.9)
MONOCYTES NFR BLD AUTO: 7 %
NEUTROPHILS # BLD AUTO: 2.6 X10E3/UL (ref 1.4–7)
NEUTROPHILS NFR BLD AUTO: 47 %
PLATELET # BLD AUTO: 245 X10E3/UL (ref 150–450)
POTASSIUM SERPL-SCNC: 4.6 MMOL/L (ref 3.5–5.2)
PROT SERPL-MCNC: 6.6 G/DL (ref 6–8.5)
RBC # BLD AUTO: 3.95 X10E6/UL (ref 3.77–5.28)
SODIUM SERPL-SCNC: 138 MMOL/L (ref 134–144)
T4 FREE SERPL-MCNC: 1.68 NG/DL (ref 0.82–1.77)
TRIGL SERPL-MCNC: 123 MG/DL (ref 0–149)
TSH SERPL DL<=0.005 MIU/L-ACNC: 2.12 UIU/ML (ref 0.45–4.5)
VLDLC SERPL CALC-MCNC: 22 MG/DL (ref 5–40)
WBC # BLD AUTO: 5.5 X10E3/UL (ref 3.4–10.8)

## 2024-03-22 DIAGNOSIS — I10 ESSENTIAL HYPERTENSION: ICD-10-CM

## 2024-03-22 RX ORDER — OLMESARTAN MEDOXOMIL 20 MG/1
TABLET ORAL
Qty: 90 TABLET | Refills: 1 | Status: SHIPPED | OUTPATIENT
Start: 2024-03-22

## 2024-05-30 ENCOUNTER — OFFICE VISIT (OUTPATIENT)
Dept: FAMILY MEDICINE CLINIC | Facility: CLINIC | Age: 59
End: 2024-05-30
Payer: COMMERCIAL

## 2024-05-30 VITALS
HEIGHT: 64 IN | DIASTOLIC BLOOD PRESSURE: 72 MMHG | HEART RATE: 95 BPM | WEIGHT: 201 LBS | RESPIRATION RATE: 18 BRPM | SYSTOLIC BLOOD PRESSURE: 102 MMHG | BODY MASS INDEX: 34.31 KG/M2 | OXYGEN SATURATION: 97 %

## 2024-05-30 DIAGNOSIS — I10 ESSENTIAL HYPERTENSION: Primary | ICD-10-CM

## 2024-05-30 PROBLEM — E04.1 NONTOXIC UNINODULAR GOITER: Status: ACTIVE | Noted: 2024-03-17

## 2024-05-30 PROBLEM — R53.83 FATIGUE: Status: ACTIVE | Noted: 2024-03-17

## 2024-05-30 PROBLEM — E06.3 AUTOIMMUNE THYROIDITIS: Status: ACTIVE | Noted: 2024-03-17

## 2024-05-30 PROBLEM — E55.9 VITAMIN D DEFICIENCY: Status: ACTIVE | Noted: 2024-03-17

## 2024-05-30 PROCEDURE — 99214 OFFICE O/P EST MOD 30 MIN: CPT | Performed by: NURSE PRACTITIONER

## 2024-05-30 RX ORDER — BUTYROSPERMUM PARKII(SHEA BUTTER), SIMMONDSIA CHINENSIS (JOJOBA) SEED OIL, ALOE BARBADENSIS LEAF EXTRACT .01; 1; 3.5 G/100G; G/100G; G/100G
LIQUID TOPICAL
COMMUNITY

## 2024-05-30 RX ORDER — CETIRIZINE HYDROCHLORIDE 10 MG/1
10 TABLET ORAL DAILY
COMMUNITY

## 2024-05-30 NOTE — PROGRESS NOTES
Subjective   Sierra Teague is a 58 y.o. female.     Chief Complaint   Patient presents with    Hypertension        History of Present Illness     The following portions of the patient's history were reviewed and updated as appropriate: allergies, current medications, past family history, past medical history, past social history, past surgical history and problem list.    Review of Systems   Constitutional:  Negative for chills, fatigue and fever.   Eyes:  Negative for blurred vision and double vision.   Respiratory:  Negative for cough, chest tightness, shortness of breath and wheezing.    Cardiovascular:  Negative for chest pain, palpitations and leg swelling.   Neurological:  Negative for dizziness, weakness and headache.       Objective   Physical Exam  Vitals and nursing note reviewed.   Constitutional:       Appearance: She is well-developed.   HENT:      Head: Normocephalic and atraumatic.   Eyes:      Conjunctiva/sclera: Conjunctivae normal.      Pupils: Pupils are equal, round, and reactive to light.   Neck:      Thyroid: No thyromegaly.   Cardiovascular:      Rate and Rhythm: Normal rate and regular rhythm.      Pulses: Normal pulses.      Heart sounds: Normal heart sounds. No murmur heard.     No friction rub. No gallop.   Pulmonary:      Effort: Pulmonary effort is normal.      Breath sounds: Normal breath sounds.   Musculoskeletal:      Cervical back: Normal range of motion and neck supple.   Lymphadenopathy:      Cervical: No cervical adenopathy.   Skin:     General: Skin is warm and dry.      Capillary Refill: Capillary refill takes 2 to 3 seconds.   Neurological:      Mental Status: She is alert and oriented to person, place, and time.      Cranial Nerves: No cranial nerve deficit.   Psychiatric:         Behavior: Behavior normal.         Thought Content: Thought content normal.         Judgment: Judgment normal.         Vitals:    05/30/24 1455   BP: 102/72   Pulse: 95   Resp: 18   SpO2: 97%     Body  mass index is 34.5 kg/m².      Procedures    Assessment & Plan   Problems Addressed this Visit          Cardiac and Vasculature    Essential hypertension - Primary     Diagnoses         Codes Comments    Essential hypertension    -  Primary ICD-10-CM: I10  ICD-9-CM: 401.9           Continue benicar 20 mg 1 p.o. QD         Return in about 6 months (around 11/30/2024) for Annual, Labs.  Answers submitted by the patient for this visit:  Other (Submitted on 5/28/2024)  Please describe your symptoms.: No symptoms  Have you had these symptoms before?: No  How long have you been having these symptoms?: 1-4 days  Please list any medications you are currently taking for this condition.: No problems  Please describe any probable cause for these symptoms. : No problems  Primary Reason for Visit (Submitted on 5/28/2024)  What is the primary reason for your visit?: Other

## 2024-05-30 NOTE — PATIENT INSTRUCTIONS
Diet recommendations per [employer]  - 20-30 g protein with each meal  - 2 servings of red meat (or higher fat meats) per week  -Increase fiber to 25-30 g per day (recommend fiber high in beta glucan, for example oats and rye and fermentable fibers (dextrin in wheat, oligosaccharides in beans, peas and lentils and pectin in pears , apples and green bananas).   - Don't limit your carbohydrates too much. Eat healthy carbohydrates (I.e. green leafy vegs (3) and fruits (2))  Ex. Rasberries have 8 g of fiber/cup; nuts 3g/oz     Return in about 6 months (around 11/30/2024) for Annual, Labs.

## 2024-06-06 DIAGNOSIS — F51.04 PSYCHOPHYSIOLOGICAL INSOMNIA: ICD-10-CM

## 2024-06-06 RX ORDER — TRAZODONE HYDROCHLORIDE 50 MG/1
50 TABLET ORAL EVERY EVENING
Qty: 90 TABLET | Refills: 1 | Status: SHIPPED | OUTPATIENT
Start: 2024-06-06

## 2024-09-20 DIAGNOSIS — I10 ESSENTIAL HYPERTENSION: ICD-10-CM

## 2024-09-20 RX ORDER — OLMESARTAN MEDOXOMIL 20 MG/1
TABLET ORAL
Qty: 90 TABLET | Refills: 1 | Status: SHIPPED | OUTPATIENT
Start: 2024-09-20

## 2024-12-01 DIAGNOSIS — F51.04 PSYCHOPHYSIOLOGICAL INSOMNIA: ICD-10-CM

## 2024-12-02 RX ORDER — TRAZODONE HYDROCHLORIDE 50 MG/1
50 TABLET, FILM COATED ORAL EVERY EVENING
Qty: 90 TABLET | Refills: 1 | Status: SHIPPED | OUTPATIENT
Start: 2024-12-02

## 2025-03-13 NOTE — TELEPHONE ENCOUNTER
Patient states top number runs between 133 to 136 bottom number is running between 95-99 please advise   Render Risk Assessment In Note?: no Detail Level: Simple Additional Notes: Site was recurrent. Re-excised 6/2024. yes